# Patient Record
Sex: MALE | Race: WHITE | Employment: UNEMPLOYED | URBAN - METROPOLITAN AREA
[De-identification: names, ages, dates, MRNs, and addresses within clinical notes are randomized per-mention and may not be internally consistent; named-entity substitution may affect disease eponyms.]

---

## 2017-01-02 PROBLEM — Z48.298 AFTERCARE FOLLOWING ORGAN TRANSPLANT: Status: ACTIVE | Noted: 2017-01-02

## 2017-01-02 PROBLEM — E11.43 DIABETIC GASTROPARESIS (H): Status: ACTIVE | Noted: 2017-01-02

## 2017-01-02 PROBLEM — E87.20 METABOLIC ACIDOSIS: Status: ACTIVE | Noted: 2017-01-02

## 2017-01-02 PROBLEM — N25.81 SECONDARY RENAL HYPERPARATHYROIDISM (H): Status: ACTIVE | Noted: 2017-01-02

## 2017-01-02 PROBLEM — K31.84 DIABETIC GASTROPARESIS (H): Status: ACTIVE | Noted: 2017-01-02

## 2017-01-04 ENCOUNTER — TELEPHONE (OUTPATIENT)
Dept: NEPHROLOGY | Facility: CLINIC | Age: 57
End: 2017-01-04

## 2017-01-04 DIAGNOSIS — E87.20 ACIDOSIS: ICD-10-CM

## 2017-01-04 DIAGNOSIS — E55.9 VITAMIN D DEFICIENCY: Primary | ICD-10-CM

## 2017-01-04 DIAGNOSIS — E87.20 METABOLIC ACIDOSIS: Primary | ICD-10-CM

## 2017-01-04 RX ORDER — SODIUM BICARBONATE 650 MG/1
1300 TABLET ORAL 2 TIMES DAILY
Qty: 60 TABLET | Refills: 11 | Status: SHIPPED | OUTPATIENT
Start: 2017-01-04

## 2017-01-04 RX ORDER — ERGOCALCIFEROL 1.25 MG/1
50000 CAPSULE, LIQUID FILLED ORAL WEEKLY
Qty: 4 CAPSULE | Refills: 6 | Status: SHIPPED | OUTPATIENT
Start: 2017-01-04 | End: 2017-06-29

## 2017-01-04 NOTE — TELEPHONE ENCOUNTER
Message left for patient to return call.     If patient returns call, message is recommend starting oral sodium bicarbonate 1300 mg bid. Patient is currently taking sodium bicarbonate 650mg by mouth twice daily. The medication is increased to the 1300 mg bid and was sent out to the pharmacy.       Shellie Vega

## 2017-01-04 NOTE — TELEPHONE ENCOUNTER
oral ergocalciferol 09089 units once weekly x 26 weeks ordered by provider and was sent to pharmacy requested by patient. Spoke with patient on new medication ordered verbalized understand and BMP recheck in one week.     Shellie Vega

## 2017-01-04 NOTE — TELEPHONE ENCOUNTER
----- Message from Stormy James LPN sent at 1/4/2017  8:37 AM CST -----      ----- Message -----     From: Stormy James LPN     Sent: 1/3/2017   8:21 AM       To: Stormy James LPN    Vitamin D deficient and recommend starting oral ergocalciferol 26113 units once weekly x 26 weeks.  Also very low bicarbonate level, likely from diarrhea.  Recommend starting oral sodium bicarbonate 1300 mg bid.  Would recheck BMP in a week.

## 2017-01-18 ENCOUNTER — TELEPHONE (OUTPATIENT)
Dept: TRANSPLANT | Facility: CLINIC | Age: 57
End: 2017-01-18

## 2017-01-18 NOTE — TELEPHONE ENCOUNTER
Tacrolimus 2.9, goal 3-5  Please call pt to verify this was a good trough level. Previous level have been at goal. Repeat in 1-2 weeks. Will make dose changes then if necessary.

## 2017-01-18 NOTE — Clinical Note
PHYSICIAN ORDERS      DATE & TIME ISSUED: 2017 11:43 AM  PATIENT NAME: Lorne Juan   : 1960     [facility] MR# [if applicable]: [id number]     DIAGNOSIS:  Kidney Transplant  ICD-10 CODE: Z94.0     Patient will be into lab to repeat Tacrolimus level.    Any questions please call: [phone]    Please fax these results to [phone].    .

## 2017-01-19 DIAGNOSIS — Z94.0 KIDNEY TRANSPLANTED: Primary | ICD-10-CM

## 2017-01-19 NOTE — TELEPHONE ENCOUNTER
Patient states accurate level and agreed to repeat prograf level next week. Order faxed to Michelle loren.

## 2017-01-24 RX ORDER — TACROLIMUS 0.5 MG/1
0.5 CAPSULE ORAL 2 TIMES DAILY
Qty: 60 CAPSULE | Refills: 1 | Status: SHIPPED | OUTPATIENT
Start: 2017-01-24 | End: 2017-03-23

## 2017-02-03 ENCOUNTER — CARE COORDINATION (OUTPATIENT)
Dept: NEPHROLOGY | Facility: CLINIC | Age: 57
End: 2017-02-03

## 2017-02-03 NOTE — PROGRESS NOTES
Checked in with patient while waiting for MD to respond. Patient feels swelling has improved slightly. He went for a 2 mile walk today with no shortness of breath. He will continue to monitor BP over the weekend. Updated message sent to provider.    Wendy Padilla RN

## 2017-02-03 NOTE — PROGRESS NOTES
Reason for Call    Received message from transplant coordinator that BP is elevated.    Spoke with patient. Said most recent BP was 180's/90's 3 days ago. Heart rate is in the 70's (usually runs in the low 60's). He is back up to 149lbs. Started experiencing shortness of breath and coughing at night. Notes bilateral lower extremity edema up to knees. Denied fever, chest pain, redness, or unusual warmth.    Currently taking lasix 20mg daily (prescribed 20mg BID), losartan 50mg daily, and coreg 6.25mg BID.    Collaboration    Dr. Zaman updated    Patient currently hospitalized.    Patient was given an opportunity to ask questions and have those questions answered to his satisfaction.  Patient verbalized understanding of instructions provided and agreed to plan of care.

## 2017-02-05 ENCOUNTER — TRANSFERRED RECORDS (OUTPATIENT)
Dept: HEALTH INFORMATION MANAGEMENT | Facility: CLINIC | Age: 57
End: 2017-02-05

## 2017-02-06 ENCOUNTER — TELEPHONE (OUTPATIENT)
Dept: TRANSPLANT | Facility: CLINIC | Age: 57
End: 2017-02-06

## 2017-02-06 NOTE — TELEPHONE ENCOUNTER
Maxim Treadwell from [facility] called [facility]OT to give update on patient care. Please call him back at [phone]. Thanks

## 2017-02-07 ENCOUNTER — TELEPHONE (OUTPATIENT)
Dept: TRANSPLANT | Facility: CLINIC | Age: 57
End: 2017-02-07

## 2017-02-07 NOTE — TELEPHONE ENCOUNTER
Returning call from Dr. Sallie Yanez. She is uncomfortable restarting pt's IS meds before talking to Dr. Zaman. Page sent to Dr. Zaman with Dr. Yanez's contact info  Phone # [phone]

## 2017-02-07 NOTE — TELEPHONE ENCOUNTER
Pt with pneumonia, PRESTON. They stopped pt's IS and put him on solucortef 50 mg q8. Asked that they please restart pt's IS meds, voiced understanding. Will fax over labs.

## 2017-02-08 NOTE — PROGRESS NOTES
Patient called back. States he is currently inpatient at a hospital in [place], [place] for pneumonia. Overall he is feeling better. No concerns for nephrology at this time.    Wendy Padilla RN

## 2017-02-09 ENCOUNTER — TELEPHONE (OUTPATIENT)
Dept: TRANSPLANT | Facility: CLINIC | Age: 57
End: 2017-02-09

## 2017-02-09 NOTE — Clinical Note
PHYSICIAN ORDERS      DATE & TIME ISSUED: 2017 11:59 AM  PATIENT NAME: Lorne Juan   : 1960     [facility] MR# [if applicable]: [id number]     DIAGNOSIS:  Kidney Transplant  ICD-10 CODE: Z94.0     Patient will be in to complete the following:   CBC   BMP   Tacrolimus drug level    Any questions please call: Transplant Center [phone]    Please fax these results to [phone].    .

## 2017-02-09 NOTE — TELEPHONE ENCOUNTER
Call from Daniel Lang NP at [facility]. They are going to most likely discharge pt today. Restarting home IS meds. Pt's Cr 2.74 today. Based on Dr. Zaman's recommendation, will get pt scheduled for follow up appointment next week. Possible renal biopsy. Message sent to scheduling to get pt nephrology appointment.     Message  Received: 2 days ago       Arnoldo Zaman MD Krull, Leisa K, RN                   Talked to outside nephrologist.  Patient is doing better pneumonia wise and off oxygen.  Creatinine is trending up, now 2.4 and he has 4 grams proteinuria.  Serum potassium was initially in high 6s, now in mid 5s.  Tacrolimus and azathioprine was on hold.  Getting hydrocortisone.  Also losartan on hold.  Fluid up.     Plan to restart azathioprine today.  Probably restart tacrolimus tomorrow.  Continue on steroids.  Will need follow up labs and possibly clinic visit end of next week.  If creatinine remains elevated, may need a biopsy.     Alejandro

## 2017-02-09 NOTE — TELEPHONE ENCOUNTER
Spoke with Lorne. Let him know the plan is to repeat labs on Monday and have a nephrology appointment next week. Possibly will need biopsy. Pt voiced understanding.

## 2017-02-10 ENCOUNTER — CARE COORDINATION (OUTPATIENT)
Dept: NEPHROLOGY | Facility: CLINIC | Age: 57
End: 2017-02-10

## 2017-02-10 NOTE — PROGRESS NOTES
Reason for Call    Patient called, said he was discharged from the hospital yesterday. He's currently scheduled for labs and a follow up in clinic next week. Multiple medications were changed. Lorne is concerned as his swelling has worsened since leaving the hospital. It is now up to his thighs. Denied chest pain or shortness of breath. He was taken off of lasix while hospitalized, wants to know if he should be taking this at home. Will update transplant coordinator and Dr. Zaman.    Collaboration    Above discussed with Dr. Zaman.  Orders received.    That is hard for me to say since he was being taken care of in an outside hospital.  It would seem reasonable for him to restart the furosemide at 20 mg bid.     Plan    1. Restart lasix 20mg BID  2. Follow up as scheduled    Patient was given an opportunity to ask questions and have those questions answered to his satisfaction.  Patient verbalized understanding of instructions provided and agreed to plan of care.    Wendy Padilla RN

## 2017-02-13 ENCOUNTER — CARE COORDINATION (OUTPATIENT)
Dept: NEPHROLOGY | Facility: CLINIC | Age: 57
End: 2017-02-13

## 2017-02-13 DIAGNOSIS — I10 HTN (HYPERTENSION): ICD-10-CM

## 2017-02-13 DIAGNOSIS — Z94.0 KIDNEY REPLACED BY TRANSPLANT: ICD-10-CM

## 2017-02-13 RX ORDER — FUROSEMIDE 20 MG
40 TABLET ORAL 2 TIMES DAILY
Qty: 60 TABLET | Refills: 1
Start: 2017-02-13

## 2017-02-13 NOTE — PROGRESS NOTES
Reason for Call    Patient called to discuss weight gain and edema. He has gained 6 pounds since Friday. Currently at 165lbs. Swelling has spread, now affects abdomen and arms. Denied chest pain or shortness of breath.    Currently taking 20mg BID lasix, wants to know if he should increase dose prior to 2/16 appointment.    Collaboration    Above discussed with Dr. Zaman.  Orders received.    Yes, he can increase to 40 mg bid.       Plan    1. Increase lasix to 40mg BID  2. Follow up at 2/16 appt.    Patient was given an opportunity to ask questions and have those questions answered to his satisfaction.  Patient verbalized understanding of instructions provided and agreed to plan of care.    Wendy Padilla RN

## 2017-02-15 ENCOUNTER — TELEPHONE (OUTPATIENT)
Dept: TRANSPLANT | Facility: CLINIC | Age: 57
End: 2017-02-15

## 2017-02-16 ENCOUNTER — OFFICE VISIT (OUTPATIENT)
Dept: NEPHROLOGY | Facility: CLINIC | Age: 57
End: 2017-02-16
Attending: INTERNAL MEDICINE
Payer: MEDICARE

## 2017-02-16 VITALS
BODY MASS INDEX: 24.89 KG/M2 | TEMPERATURE: 97.7 F | SYSTOLIC BLOOD PRESSURE: 157 MMHG | WEIGHT: 158.6 LBS | HEART RATE: 77 BPM | DIASTOLIC BLOOD PRESSURE: 83 MMHG | OXYGEN SATURATION: 95 % | HEIGHT: 67 IN

## 2017-02-16 DIAGNOSIS — I12.9 RENAL HYPERTENSION, STAGE 1-4 OR UNSPECIFIED CHRONIC KIDNEY DISEASE: Primary | ICD-10-CM

## 2017-02-16 PROCEDURE — 99213 OFFICE O/P EST LOW 20 MIN: CPT | Mod: ZF

## 2017-02-16 RX ORDER — HYDRALAZINE HYDROCHLORIDE 50 MG/1
50 TABLET, FILM COATED ORAL 3 TIMES DAILY
Qty: 120 TABLET | Refills: 1 | Status: SHIPPED | OUTPATIENT
Start: 2017-02-16 | End: 2017-02-23 | Stop reason: ALTCHOICE

## 2017-02-16 ASSESSMENT — PAIN SCALES - GENERAL: PAINLEVEL: NO PAIN (0)

## 2017-02-16 NOTE — NURSING NOTE
"Chief Complaint   Patient presents with     RECHECK     Follow up kidney transplant       Initial /83  Pulse 77  Temp 97.7  F (36.5  C) (Oral)  Ht 1.702 m (5' 7\")  Wt 71.9 kg (158 lb 9.6 oz)  SpO2 95%  BMI 24.84 kg/m2 Estimated body mass index is 24.84 kg/(m^2) as calculated from the following:    Height as of this encounter: 1.702 m (5' 7\").    Weight as of this encounter: 71.9 kg (158 lb 9.6 oz).  Medication Reconciliation: jennifer LOTT CMA    "

## 2017-02-16 NOTE — LETTER
2/16/2017      RE: Lorne Juan  [address]  [facility] [address]       Assessment and Plan:  1. LDKT - baseline Cr ~ 1.9-2.2, Kidney transplant biopsy 9/2015 showed moderate to severe chronic changes with CAN and diabetic nephropathy.  Mild proteinuria.  Positive DSA to DR6 at ~ 3500 mfi.   -Cr slightly up,cardio-renal, still with edema, , will give Laisix 40 mg BID.   -INS: no changes at this time.     2. HTN - slightly elevated, still volume up. Will incresed his diuretic .    3. DM, s/p failed pancreas transplant - following with his PCP. Glucose WNL on BMP     4. Anemia in chronic renal disease - stable Hgb on MANDY. Will recheck iron studies        5. Secondary renal hyperparathyroidism - will check PTH and vitamin D level.    6. Vitamin D deficiency - will check vitamin D level and continue cholecalciferol.    7. Metabolic acidosis - likely secondary to decreased kidney function and diarrhea.  On Na bicarbonate     8. Diabetic gastroparesis - stable symptoms and patient is following with GI.    9. Skin cancer risk - no new skin lesions.  He will see the dermatologist on 2/21/17     Assessment and plan was discussed with patient and he voiced his understanding and agreement.    Reason for Visit:  Mr. Juan is here for routine follow up.    HPI:   Lorne Juan is a 56 year old male with ESKD from DM and is status post LDKT on 2/15/91.         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    Mr. Juan was recent in the hospital with CAP and ARF. His lasix and Losartan were hold at D/C. He noticed increased in  edema and weight. Was restarted on Lasix 40 mg Qd since Mo, and he noticed an improvement, he lost 9 pounds, but he still have edema.His Cr today is slightly up -trending , possible ove-diurasing     Home BP: 130-150/70-80s.      ROS:   A comprehensive review of systems was obtained and negative, except as noted in the HPI or PMH.    Active Medical Problems:  Patient Active Problem List   Diagnosis     Status post pancreas transplantation (H)     Thrombocytopenia (H)     Vitamin D deficiency     Osteoporosis     Disorder of bone and cartilage     Diabetes mellitus type 1 (H)     Kidney replaced by transplant     Immunosuppressed status (H)     Anemia in chronic renal disease     Metabolic acidosis     Aftercare following organ transplant     Secondary renal hyperparathyroidism (H)     Diabetic gastroparesis (H)       Personal Hx:  Social History     Social History     Marital status: Single     Spouse name: N/A     Number of children: N/A     Years of education: N/A     Occupational History     Not on file.     Social History Main Topics     Smoking status: Never Smoker     Smokeless tobacco: Never Used     Alcohol use Yes      Comment: Rare     Drug use: No     Sexual activity: Not on file     Other Topics Concern     Not on file     Social History Narrative       Allergies:  Allergies   Allergen Reactions     Bactrim Diarrhea     Benadryl [Altaryl]      hyperactivity     Diamox [Acetazolamide Sodium] Other (See Comments)     Light headedness     Morphine Sulfate Itching       Medications:  Prior to Admission medications    Medication Sig Start Date End Date Taking? Authorizing Provider   azaTHIOprine 75 MG TABS Take 75 mg by mouth daily 12/20/16  Yes Arnoldo Zaman MD   sodium bicarbonate 650 MG tablet Take 1 tablet (650 mg) by mouth 2 times daily 12/20/16  Yes Arnoldo Zaman MD   LANTUS SOLOSTAR 100 UNIT/ML soln Inject 4 units in the AM; inject 6 units in the PM 11/22/16  Yes Taylor Bello PA-C    losartan (COZAAR) 25 MG tablet Take 2 tablets (50 mg) by mouth daily 11/21/16  Yes Arnoldo Zaman MD   blood glucose monitoring (NO BRAND SPECIFIED) test strip Test 6 times per day with  Prodigy no  Coding  Glucose strips . 11/11/16  Yes Taylor Bello PA-C   insulin pen needle (BD MARQUISE U/F) 32G X 4 MM Use 4-6 daily or as directed. 11/11/16  Yes Taylor Bello PA-C   furosemide (LASIX) 20 MG tablet Take 1 tablet (20 mg) by mouth 2 times daily 8/16/16  Yes Arnoldo Zaman MD   carvedilol (COREG) 6.25 MG tablet Take 1 tablet (6.25 mg) by mouth 2 times daily 8/15/16  Yes Arnoldo Zaman MD   tacrolimus (PROGRAF - GENERIC EQUIVALENT) 0.5 MG capsule Take 1 capsule (0.5 mg) by mouth 2 times daily 6/22/16  Yes Daija Mccann MD   insulin lispro (HUMALOG) 100 UNIT/ML Cartridge Carb counting with meals approx 20 units daily 4/19/16  Yes Taylor Bello PA-C   glucagon (GLUCAGON EMERGENCY) 1 MG injection Use in event of hypoglycemia 3/21/16  Yes Alis Slaughter MD   predniSONE (DELTASONE) 2.5 MG tablet Take 1 tablet (2.5 mg) by mouth daily 3/14/16  Yes Daija Mccann MD   alendronate (FOSAMAX) 70 MG tablet Take 1 tablet (70 mg) by mouth every 7 days 11/3/15  Yes Alis Slaughter MD   insulin glargine (LANTUS) 100 UNIT/ML vial Inject 4 units in the AM; inject 6 units in the PM. 8/25/15  Yes Precious Bahena PA-C   Injection Device for insulin (HUMAPEN LUXURA HD) ROBINSON 1 each 5 times daily Use as directed with Humalog 5/22/15  Yes Taylor Bello PA-C   aspirin  MG tablet Take 1 tablet (325 mg) by mouth daily 10/23/13  Yes Isauro Mehta MD   calcium 500-125 MG-UNIT TABS Take  by mouth. Plus D take one daily   Yes Reported, Patient   brimonidine-timolol (COMBIGAN) 0.2-0.5 % ophthalmic solution Place 1 drop into the right eye 2 times daily NOT ON HOLD   Yes Unknown, Entered By History   Multiple Vitamin (MULTIVITAMIN OR) Take 1 tablet by mouth daily. 4/25/11   "Yes Reported, Patient   brinzolamide (AZOPT) 1 % ophthalmic suspension Place 1 drop into the right eye 2 times daily. 4/25/11  Yes Reported, Patient       Vitals:  /83  Pulse 77  Temp 97.7  F (36.5  C) (Oral)  Ht 1.702 m (5' 7\")  Wt 71.9 kg (158 lb 9.6 oz)  SpO2 95%  BMI 24.84 kg/m2    Exam:   GENERAL APPEARANCE: alert and no distress  HENT: mouth without ulcers or lesions  LYMPHATICS: no cervical or supraclavicular nodes  RESP: lungs clear to auscultation - no rales, rhonchi or wheezes  CV: regular rhythm, normal rate, no rub, no murmur  EDEMA: no LE edema bilaterally  ABDOMEN: soft, nondistended, nontender, bowel sounds normal  MS: extremities normal - no gross deformities noted, no evidence of inflammation in joints, no muscle tenderness  SKIN: no rash  TX KIDNEY: normal    Results: reviewed, last labs on 2/13/17   Cr: 2.4 slightly up   BUN: 62     Patient was seen and evaluated by me, John Dupont MD. I have reviewed the note and agree with the the plan of care as documented by the fellow.      Eben Oliveira MD      "

## 2017-02-16 NOTE — MR AVS SNAPSHOT
"              After Visit Summary   2/16/2017    Lorne Juan    MRN: [id number]           Patient Information     Date Of Birth          1960        Visit Information        Provider Department      2/16/2017 3:30 PM Eben Oliveira MD [facility]        Today's Diagnoses     Renal hypertension, stage 1-4 or unspecified chronic kidney disease    -  1       Follow-ups after your visit        Your next 10 appointments already scheduled     Jun 06, 2017 10:15 AM CDT   Lab with  LAB   [facility] ([facility])    [address] [place] [address]  1st Floor  [facility] [address] [phone]            Jun 06, 2017 11:00 AM CDT   (Arrive by 10:30 AM)   Return Kidney Transplant with Arnoldo Zaman MD   [facility] ([facility])    [address] [place] [address]  3rd [facility] [address] [phone]              Who to contact     If you have questions or need follow up information about today's clinic visit or your schedule please contact [facility] NEPHROLOG[facility] directly at [phone].  Normal or non-critical lab and imaging results will be communicated to you by Automsofthart, letter or phone within 4 business days after the clinic has received the results. If you do not hear from us within 7 days, please contact the clinic through Actacellt or phone. If you have a critical or abnormal lab result, we will notify you by phone as soon as possible.  Submit refill requests through Matterport or call your pharmacy and they will forward the refill request to us. Please allow 3 business days for your refill to be completed.          Additional Information About Your Visit        Automsofthart Information     Matterport lets you send messages to your doctor, view your test results, renew your prescriptions, schedule appointments and more. To sign up, go to [email or web address] . Click on \"Log in\" on the left side of the screen, which will take you to the " "Welcome page. Then click on \"Sign up Now\" on the right side of the page.     You will be asked to enter the access code listed below, as well as some personal information. Please follow the directions to create your username and password.     Your access code is: [email or web address]  Expires: 2017  1:44 PM     Your access code will  in 90 days. If you need help or a new code, please call your [facility] or [phone].        Care EveryWhere ID     This is your Care EveryWhere ID. This could be used by other organizations to access your [facility] medical records  [id number]        Your Vitals Were     Pulse Temperature Height Pulse Oximetry BMI (Body Mass Index)       77 97.7  F (36.5  C) (Oral) 1.702 m (5' 7\") 95% 24.84 kg/m2        Blood Pressure from Last 3 Encounters:   17 157/83   16 134/79   16 142/81    Weight from Last 3 Encounters:   17 71.9 kg (158 lb 9.6 oz)   16 54.4 kg (120 lb)   16 71.2 kg (157 lb)              Today, you had the following     No orders found for display         Today's Medication Changes          These changes are accurate as of: 17 11:59 PM.  If you have any questions, ask your nurse or doctor.               Start taking these medicines.        Dose/Directions    hydrALAZINE 50 MG tablet   Commonly known as:  APRESOLINE   Used for:  Renal hypertension, stage 1-4 or unspecified chronic kidney disease        Dose:  50 mg   Take 1 tablet (50 mg) by mouth 3 times daily   Quantity:  120 tablet   Refills:  1            Where to get your medicines      These medications were sent to [place], [place] - [address] [place] [address] [facility] [address]    Hours:  TRANSPLANT PHONE NUMBER [phone] Phone:  [phone]     hydrALAZINE 50 MG tablet                Primary Care Provider    None Specified       No primary provider on file.        Thank you!     Thank you for " choosing [facility] NEPHROLOGY  for your care. Our goal is always to provide you with excellent care. Hearing back from our patients is one way we can continue to improve our services. Please take a few minutes to complete the written survey that you may receive in the mail after your visit with us. Thank you!             Your Updated Medication List - Protect others around you: Learn how to safely use, store and throw away your medicines at www.disposemymeds.org.          This list is accurate as of: 2/16/17 11:59 PM.  Always use your most recent med list.                   Brand Name Dispense Instructions for use    alendronate 70 MG tablet    FOSAMAX    12 tablet    Take 1 tablet (70 mg) by mouth every 7 days       aspirin  MG EC tablet     90 tablet    Take 1 tablet (325 mg) by mouth daily       azaTHIOprine 75 MG Tabs     90 tablet    Take 75 mg by mouth daily       blood glucose monitoring test strip    no brand specified    550 each    Test 6 times per day with  Prodigy no  Coding  Glucose strips .       brimonidine-timolol 0.2-0.5 % ophthalmic solution    COMBIGAN     Place 1 drop into the right eye 2 times daily NOT ON HOLD       brinzolamide 1 % ophthalmic susp    AZOPT     Place 1 drop into the right eye 2 times daily.       calcium 500-125 MG-UNIT Tabs      Take  by mouth. Plus D take one daily       furosemide 20 MG tablet    LASIX    60 tablet    Take 2 tablets (40 mg) by mouth 2 times daily       glucagon 1 MG kit    GLUCAGON EMERGENCY    2 each    Use in event of hypoglycemia       hydrALAZINE 50 MG tablet    APRESOLINE    120 tablet    Take 1 tablet (50 mg) by mouth 3 times daily       Injection Device for insulin Helen SANTOROURA HD    1 each    1 each 5 times daily Use as directed with Humalog       * insulin glargine 100 UNIT/ML injection    LANTUS    15 mL    Inject 4 units in the AM; inject 6 units in the PM.       * LANTUS SOLOSTAR 100 UNIT/ML injection   Generic drug:  insulin  glargine     15 mL    Inject 4 units in the AM; inject 6 units in the PM       insulin lispro 100 UNIT/ML Cartridge    HUMALOG    15 mL    Carb counting with meals approx 20 units daily       insulin pen needle 32G X 4 MM    BD MARQUISE U/F    400 each    Use 4-6 daily or as directed.       losartan 25 MG tablet    COZAAR    180 tablet    Take 2 tablets (50 mg) by mouth daily       MULTIVITAMIN PO      Take 1 tablet by mouth daily.       sodium bicarbonate 650 MG tablet     60 tablet    Take 2 tablets (1,300 mg) by mouth 2 times daily       vitamin D 36792 UNIT capsule    ERGOCALCIFEROL    4 capsule    Take 1 capsule (50,000 Units) by mouth once a week for 26 doses       * Notice:  This list has 2 medication(s) that are the same as other medications prescribed for you. Read the directions carefully, and ask your doctor or other care provider to review them with you.

## 2017-02-16 NOTE — TELEPHONE ENCOUNTER
Weight this am 156. BP improved.  Does complain his R arm is aching after IV with last hospital admission. He will discuss with nephrology this afternoon at his appointment.

## 2017-02-23 ENCOUNTER — CARE COORDINATION (OUTPATIENT)
Dept: NEPHROLOGY | Facility: CLINIC | Age: 57
End: 2017-02-23

## 2017-02-23 DIAGNOSIS — I10 HTN (HYPERTENSION): ICD-10-CM

## 2017-02-23 RX ORDER — CARVEDILOL 6.25 MG/1
37.5 TABLET ORAL 2 TIMES DAILY
Qty: 60 TABLET | Refills: 11 | COMMUNITY
Start: 2017-02-23

## 2017-02-23 RX ORDER — AMLODIPINE BESYLATE 5 MG/1
5 TABLET ORAL DAILY
Qty: 90 TABLET | Refills: 3 | COMMUNITY
Start: 2017-02-23 | End: 2017-06-22

## 2017-02-23 NOTE — PROGRESS NOTES
Reason for Call    Received message from transplant coordinator regarding BP concerns and med changes.    Patient met with a local nephrologist who switched around his medications. Furosemide dose stayed the same. She discontinued hydralazine and losartan, increased carvedilol to 37.5mg BID (from 25mg), and added amlodipine 5mg.    Weight is stable around 160lbs. Notes ongoing edema. Shortness of breath has improved, he thinks it was due to hydralazine. Hasn't checked BP yet today. He's scheduled to meet with her again next week.    Collaboration    Updated Dr. Zaman.     He needs to just follow up with his primary nephrologist at this point.     Plan    1. Follow up with primary nephrologist    Patient was given an opportunity to ask questions and have those questions answered to his satisfaction.  Patient verbalized understanding of instructions provided and agreed to plan of care.      Wendy Padilla RN

## 2017-02-27 ENCOUNTER — TELEPHONE (OUTPATIENT)
Dept: TRANSPLANT | Facility: CLINIC | Age: 57
End: 2017-02-27

## 2017-02-27 NOTE — TELEPHONE ENCOUNTER
Staff message from Wendy in nephrology that pt had questions about transplant.   I called Lorne. He was curious how the different medications worked to manage his BP, fluid status, and impacted his kidney. We discussed this.   He is almost down to his dry weight. Is wondering if lasix dose can be decreased. Will have nephrology follow up with him.

## 2017-03-23 DIAGNOSIS — Z94.0 KIDNEY TRANSPLANTED: ICD-10-CM

## 2017-03-23 DIAGNOSIS — Z94.0 KIDNEY REPLACED BY TRANSPLANT: Primary | ICD-10-CM

## 2017-03-24 RX ORDER — TACROLIMUS 0.5 MG/1
0.5 CAPSULE ORAL 2 TIMES DAILY
Qty: 60 CAPSULE | Refills: 11 | Status: SHIPPED | OUTPATIENT
Start: 2017-03-24 | End: 2018-04-02

## 2017-03-24 RX ORDER — PREDNISONE 2.5 MG/1
2.5 TABLET ORAL DAILY
Qty: 30 TABLET | Refills: 11 | Status: SHIPPED | OUTPATIENT
Start: 2017-03-24 | End: 2018-05-03

## 2017-04-23 NOTE — PROGRESS NOTES
Assessment and Plan:  1. LDKT - baseline Cr ~ 1.9-2.2, Kidney transplant biopsy 9/2015 showed moderate to severe chronic changes with CAN and diabetic nephropathy.  Mild proteinuria.  Positive DSA to DR6 at ~ 3500 mfi.   -Cr slightly up,cardio-renal, still with edema, , will give Laisix 40 mg BID.   -INS: no changes at this time.     2. HTN - slightly elevated, still volume up. Will incresed his diuretic .    3. DM, s/p failed pancreas transplant - following with his PCP. Glucose WNL on BMP     4. Anemia in chronic renal disease - stable Hgb on MANDY. Will recheck iron studies        5. Secondary renal hyperparathyroidism - will check PTH and vitamin D level.    6. Vitamin D deficiency - will check vitamin D level and continue cholecalciferol.    7. Metabolic acidosis - likely secondary to decreased kidney function and diarrhea.  On Na bicarbonate     8. Diabetic gastroparesis - stable symptoms and patient is following with GI.    9. Skin cancer risk - no new skin lesions.  He will see the dermatologist on 2/21/17     Assessment and plan was discussed with patient and he voiced his understanding and agreement.    Reason for Visit:  Mr. Juan is here for routine follow up.    HPI:   Lorne Juan is a 56 year old male with ESKD from DM and is status post LDKT on 2/15/91.         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    Mr. Juan was recent in the hospital with CAP and ARF. His lasix and Losartan were hold at D/C. He noticed increased in edema and weight. Was restarted on Lasix 40 mg Qd since Mo, and he noticed an  improvement, he lost 9 pounds, but he still have edema.His Cr today is slightly up -trending , possible ove-diurasing     Home BP: 130-150/70-80s.      ROS:   A comprehensive review of systems was obtained and negative, except as noted in the HPI or PMH.    Active Medical Problems:  Patient Active Problem List   Diagnosis     Status post pancreas transplantation (H)     Thrombocytopenia (H)     Vitamin D deficiency     Osteoporosis     Disorder of bone and cartilage     Diabetes mellitus type 1 (H)     Kidney replaced by transplant     Immunosuppressed status (H)     Anemia in chronic renal disease     Metabolic acidosis     Aftercare following organ transplant     Secondary renal hyperparathyroidism (H)     Diabetic gastroparesis (H)       Personal Hx:  Social History     Social History     Marital status: Single     Spouse name: N/A     Number of children: N/A     Years of education: N/A     Occupational History     Not on file.     Social History Main Topics     Smoking status: Never Smoker     Smokeless tobacco: Never Used     Alcohol use Yes      Comment: Rare     Drug use: No     Sexual activity: Not on file     Other Topics Concern     Not on file     Social History Narrative       Allergies:  Allergies   Allergen Reactions     Bactrim Diarrhea     Benadryl [Altaryl]      hyperactivity     Diamox [Acetazolamide Sodium] Other (See Comments)     Light headedness     Morphine Sulfate Itching       Medications:  Prior to Admission medications    Medication Sig Start Date End Date Taking? Authorizing Provider   azaTHIOprine 75 MG TABS Take 75 mg by mouth daily 12/20/16  Yes Arnoldo Zaman MD   sodium bicarbonate 650 MG tablet Take 1 tablet (650 mg) by mouth 2 times daily 12/20/16  Yes Arnoldo Zaman MD   LANTUS SOLOSTAR 100 UNIT/ML soln Inject 4 units in the AM; inject 6 units in the PM 11/22/16  Yes Taylor Bello PA-C   losartan (COZAAR) 25 MG tablet Take 2 tablets (50 mg) by mouth daily  11/21/16  Yes Arnoldo Zaman MD   blood glucose monitoring (NO BRAND SPECIFIED) test strip Test 6 times per day with  Prodigy no  Coding  Glucose strips . 11/11/16  Yes Taylor Bello PA-C   insulin pen needle (BD MARQUISE U/F) 32G X 4 MM Use 4-6 daily or as directed. 11/11/16  Yes Taylor Bello PA-C   furosemide (LASIX) 20 MG tablet Take 1 tablet (20 mg) by mouth 2 times daily 8/16/16  Yes Arnoldo Zaman MD   carvedilol (COREG) 6.25 MG tablet Take 1 tablet (6.25 mg) by mouth 2 times daily 8/15/16  Yes Arnoldo Zaman MD   tacrolimus (PROGRAF - GENERIC EQUIVALENT) 0.5 MG capsule Take 1 capsule (0.5 mg) by mouth 2 times daily 6/22/16  Yes Daija Mccann MD   insulin lispro (HUMALOG) 100 UNIT/ML Cartridge Carb counting with meals approx 20 units daily 4/19/16  Yes Taylor Bello PA-C   glucagon (GLUCAGON EMERGENCY) 1 MG injection Use in event of hypoglycemia 3/21/16  Yes Alis Slaughter MD   predniSONE (DELTASONE) 2.5 MG tablet Take 1 tablet (2.5 mg) by mouth daily 3/14/16  Yes Daija Mccann MD   alendronate (FOSAMAX) 70 MG tablet Take 1 tablet (70 mg) by mouth every 7 days 11/3/15  Yes Alis Slaughter MD   insulin glargine (LANTUS) 100 UNIT/ML vial Inject 4 units in the AM; inject 6 units in the PM. 8/25/15  Yes Precious Bahena PA-C   Injection Device for insulin (HUMAPEN LUXURA HD) ROBINSON 1 each 5 times daily Use as directed with Humalog 5/22/15  Yes Taylor Bello PA-C   aspirin  MG tablet Take 1 tablet (325 mg) by mouth daily 10/23/13  Yes Isauro Mehta MD   calcium 500-125 MG-UNIT TABS Take  by mouth. Plus D take one daily   Yes Reported, Patient   brimonidine-timolol (COMBIGAN) 0.2-0.5 % ophthalmic solution Place 1 drop into the right eye 2 times daily NOT ON HOLD   Yes Unknown, Entered By History   Multiple Vitamin (MULTIVITAMIN OR) Take 1 tablet by mouth daily. 4/25/11  Yes Reported, Patient   brinzolamide (AZOPT) 1 % ophthalmic suspension  "Place 1 drop into the right eye 2 times daily. 4/25/11  Yes Reported, Patient       Vitals:  /83  Pulse 77  Temp 97.7  F (36.5  C) (Oral)  Ht 1.702 m (5' 7\")  Wt 71.9 kg (158 lb 9.6 oz)  SpO2 95%  BMI 24.84 kg/m2    Exam:   GENERAL APPEARANCE: alert and no distress  HENT: mouth without ulcers or lesions  LYMPHATICS: no cervical or supraclavicular nodes  RESP: lungs clear to auscultation - no rales, rhonchi or wheezes  CV: regular rhythm, normal rate, no rub, no murmur  EDEMA: no LE edema bilaterally  ABDOMEN: soft, nondistended, nontender, bowel sounds normal  MS: extremities normal - no gross deformities noted, no evidence of inflammation in joints, no muscle tenderness  SKIN: no rash  TX KIDNEY: normal    Results: reviewed, last labs on 2/13/17   Cr: 2.4 slightly up   BUN: 62     Patient was seen and evaluated by me, John Dupont MD. I have reviewed the note and agree with the the plan of care as documented by the fellow.    "

## 2017-05-25 ENCOUNTER — TELEPHONE (OUTPATIENT)
Dept: NEPHROLOGY | Facility: CLINIC | Age: 57
End: 2017-05-25

## 2017-05-25 NOTE — TELEPHONE ENCOUNTER
Fernando, this is Junie's office from [facility] on the 3rd floor at [facility] located at [address] [place] [address]. We are reminding you of your upcoming Nephrology appointment on 6/6/2017 at 11:00 am. Please arrive an hour prior to your appointment time for labs. Also, please bring an updated medication list including dose of prescribed and over the counter medications you are currently taking or your labeled medication bottles with you to your appointment. If you have any questions or would like to cancel or reschedule your appointment, please call us at [phone].     If you are having labs draw same day you need a lab appointment scheduled 1 hour prior to your visit.    You are welcome to have your labs done 2-7 days before your appointment at any [facility] or [facility] facility. If you have your labs completed before your appointment, please still come 30 minutes early for check-in.     Thank-You for allowing us to be a member of your Health Care Team,     Ainsley De Jesus,. CMA

## 2017-06-02 ENCOUNTER — TELEPHONE (OUTPATIENT)
Dept: TRANSPLANT | Facility: CLINIC | Age: 57
End: 2017-06-02

## 2017-06-02 NOTE — TELEPHONE ENCOUNTER
Spoke with patient about missing medications.  Plan is to get his medications today by mail.    Instructed to take a dose as soon as medications arrive.  Then repeat taking the Tacrolimus   In 6 hours.  Patient should then be caught up with transplant medication doses.

## 2017-06-06 ENCOUNTER — TELEPHONE (OUTPATIENT)
Dept: TRANSPLANT | Facility: CLINIC | Age: 57
End: 2017-06-06

## 2017-06-06 ENCOUNTER — RESULTS ONLY (OUTPATIENT)
Dept: OTHER | Facility: CLINIC | Age: 57
End: 2017-06-06

## 2017-06-06 ENCOUNTER — OFFICE VISIT (OUTPATIENT)
Dept: NEPHROLOGY | Facility: CLINIC | Age: 57
End: 2017-06-06
Attending: INTERNAL MEDICINE
Payer: MEDICARE

## 2017-06-06 VITALS
TEMPERATURE: 97.5 F | HEART RATE: 62 BPM | HEIGHT: 67 IN | SYSTOLIC BLOOD PRESSURE: 121 MMHG | RESPIRATION RATE: 18 BRPM | DIASTOLIC BLOOD PRESSURE: 71 MMHG | BODY MASS INDEX: 23.25 KG/M2 | WEIGHT: 148.1 LBS

## 2017-06-06 DIAGNOSIS — D63.1 ANEMIA IN CHRONIC RENAL DISEASE: ICD-10-CM

## 2017-06-06 DIAGNOSIS — Z94.83 STATUS POST PANCREAS TRANSPLANTATION (H): ICD-10-CM

## 2017-06-06 DIAGNOSIS — D84.9 IMMUNOSUPPRESSED STATUS (H): ICD-10-CM

## 2017-06-06 DIAGNOSIS — Z79.899 HIGH RISK MEDICATION USE: ICD-10-CM

## 2017-06-06 DIAGNOSIS — N18.9 ANEMIA IN CHRONIC RENAL DISEASE: ICD-10-CM

## 2017-06-06 DIAGNOSIS — N25.81 SECONDARY RENAL HYPERPARATHYROIDISM (H): ICD-10-CM

## 2017-06-06 DIAGNOSIS — E55.9 VITAMIN D DEFICIENCY: ICD-10-CM

## 2017-06-06 DIAGNOSIS — Z48.298 AFTERCARE FOLLOWING ORGAN TRANSPLANT: ICD-10-CM

## 2017-06-06 DIAGNOSIS — I15.1 HYPERTENSION SECONDARY TO OTHER RENAL DISORDERS: ICD-10-CM

## 2017-06-06 DIAGNOSIS — Z94.0 KIDNEY REPLACED BY TRANSPLANT: Primary | ICD-10-CM

## 2017-06-06 DIAGNOSIS — Z94.0 KIDNEY REPLACED BY TRANSPLANT: ICD-10-CM

## 2017-06-06 LAB
AMYLASE SERPL-CCNC: 40 U/L (ref 30–110)
ANION GAP SERPL CALCULATED.3IONS-SCNC: 10 MMOL/L (ref 3–14)
BUN SERPL-MCNC: 67 MG/DL (ref 7–30)
CALCIUM SERPL-MCNC: 7.6 MG/DL (ref 8.5–10.1)
CHLORIDE SERPL-SCNC: 105 MMOL/L (ref 94–109)
CHOLEST SERPL-MCNC: 100 MG/DL
CO2 SERPL-SCNC: 21 MMOL/L (ref 20–32)
CREAT SERPL-MCNC: 2.95 MG/DL (ref 0.66–1.25)
CREAT UR-MCNC: 30 MG/DL
ERYTHROCYTE [DISTWIDTH] IN BLOOD BY AUTOMATED COUNT: 17.5 % (ref 10–15)
GFR SERPL CREATININE-BSD FRML MDRD: 22 ML/MIN/1.7M2
GLUCOSE SERPL-MCNC: 181 MG/DL (ref 70–99)
HBA1C MFR BLD: 6.1 % (ref 4.3–6)
HCT VFR BLD AUTO: 33.3 % (ref 40–53)
HDLC SERPL-MCNC: 37 MG/DL
HGB BLD-MCNC: 10.6 G/DL (ref 13.3–17.7)
LDLC SERPL CALC-MCNC: 50 MG/DL
LIPASE SERPL-CCNC: 96 U/L (ref 73–393)
MCH RBC QN AUTO: 30 PG (ref 26.5–33)
MCHC RBC AUTO-ENTMCNC: 31.8 G/DL (ref 31.5–36.5)
MCV RBC AUTO: 94 FL (ref 78–100)
NONHDLC SERPL-MCNC: 64 MG/DL
PLATELET # BLD AUTO: 182 10E9/L (ref 150–450)
POTASSIUM SERPL-SCNC: 4.4 MMOL/L (ref 3.4–5.3)
PROT UR-MCNC: 0.56 G/L
PROT/CREAT 24H UR: 1.87 G/G CR (ref 0–0.2)
RBC # BLD AUTO: 3.53 10E12/L (ref 4.4–5.9)
SODIUM SERPL-SCNC: 137 MMOL/L (ref 133–144)
TRIGL SERPL-MCNC: 68 MG/DL
WBC # BLD AUTO: 4.7 10E9/L (ref 4–11)

## 2017-06-06 PROCEDURE — 83690 ASSAY OF LIPASE: CPT | Performed by: INTERNAL MEDICINE

## 2017-06-06 PROCEDURE — 99213 OFFICE O/P EST LOW 20 MIN: CPT | Mod: ZF

## 2017-06-06 PROCEDURE — 85027 COMPLETE CBC AUTOMATED: CPT | Performed by: INTERNAL MEDICINE

## 2017-06-06 PROCEDURE — 36415 COLL VENOUS BLD VENIPUNCTURE: CPT | Performed by: INTERNAL MEDICINE

## 2017-06-06 PROCEDURE — 83036 HEMOGLOBIN GLYCOSYLATED A1C: CPT | Performed by: INTERNAL MEDICINE

## 2017-06-06 PROCEDURE — 80048 BASIC METABOLIC PNL TOTAL CA: CPT | Performed by: INTERNAL MEDICINE

## 2017-06-06 PROCEDURE — 86833 HLA CLASS II HIGH DEFIN QUAL: CPT | Performed by: INTERNAL MEDICINE

## 2017-06-06 PROCEDURE — 84156 ASSAY OF PROTEIN URINE: CPT | Performed by: INTERNAL MEDICINE

## 2017-06-06 PROCEDURE — 82150 ASSAY OF AMYLASE: CPT | Performed by: INTERNAL MEDICINE

## 2017-06-06 PROCEDURE — 87799 DETECT AGENT NOS DNA QUANT: CPT | Performed by: INTERNAL MEDICINE

## 2017-06-06 PROCEDURE — 80061 LIPID PANEL: CPT | Performed by: INTERNAL MEDICINE

## 2017-06-06 PROCEDURE — 86832 HLA CLASS I HIGH DEFIN QUAL: CPT | Performed by: INTERNAL MEDICINE

## 2017-06-06 RX ORDER — LOSARTAN POTASSIUM 25 MG/1
25 TABLET ORAL
COMMUNITY
Start: 2017-03-02

## 2017-06-06 RX ORDER — CALCITRIOL 0.25 UG/1
0.25 CAPSULE, LIQUID FILLED ORAL
COMMUNITY
Start: 2017-04-26

## 2017-06-06 ASSESSMENT — PAIN SCALES - GENERAL: PAINLEVEL: NO PAIN (0)

## 2017-06-06 NOTE — LETTER
6/6/2017      RE: Lorne Juan  [address]  [facility] [address]       Assessment and Plan:  1. LDKT - baseline Cr ~ 1.9-2.2, which has increased with last couple of lab checks, now up to 2.9 today.  Kidney transplant biopsy 9/2015 showed moderate to severe chronic changes with CAN and diabetic nephropathy.  Increased proteinuria, now moderate at almost 2 grams.  Positive DSA in the past.  Most recent tacrolimus level has been running lower than goal and with change in immunosuppression, concerned that higher creatinine could be an indication for acute rejection versus progression of chronic changes.  Would recommend a kidney transplant biopsy.  Patient would prefer to get that done at home by his local Nephrologist and will see if that can happen.  Will hold off on any changes in immunosuppression at this time, except to increase tacrolimus to goal.  2. HTN - okay control at target of less than 140/90 at clinic visits, but seems to be running higher at home.  With significant edema, will increase furosemide to 60 mg bid.  Patient to follow up with PCP and/or primary Nephrologist.  Recommend patient bring in his BP cuff to his local clinic to ensure it is accurate.  3. DM, s/p failed pancreas transplant - improved control with last HbA1c 6.1%, possibly from worsening kidney function.  4. Anemia in chronic renal disease - improved Hgb on MANDY.  Iron replete.  Will defer to primary Nephrologist to manage.  5. Secondary renal hyperparathyroidism - minimal increase in PTH and will continue on calcitriol.  Will defer to primary Nephrologist to manage.  6. Vitamin D deficiency - low vitamin D level and continue ergocalciferol.  Will defer to primary Nephrologist to manage.  7. Hypocalcemia - low serum calcium level and recommend increasing oral calcium supplement to twice daily.  8. Metabolic acidosis - slightly low, stable serum bicarbonate level.  Will continue on sodium bicarbonate supplement.  Will defer to  primary Nephrologist to manage.  9. Diabetic gastroparesis - much improved symptoms off mycophenolic acid.  Patient to follow up with GI as needed.  10. Chronic diarrhea - resolved after change from mycophenolic acid to azathioprine.  11. Weight loss - stable to improved weight after resolution of diarrhea.  12. Skin cancer risk - no new skin lesions.  Recommend regular follow up with Dermatology.  13. Recommend return visit in 8 months.    Assessment and plan was discussed with patient and he voiced his understanding and agreement.    Reason for Visit:  Mr. Juan is here for routine follow up.    HPI:   Lorne Juan is a 56 year old male with ESKD from DM and is status post LDKT on 2/15/91.         Transplant Hx:       Tx: LDKT  Date: 2/15/91       Present Maintenance IS: Tacrolimus, Azathioprine and Prednisone       Baseline Creatinine: 1.9-2.2       Recent DSA: ?  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    Mr. Juan reports feeling okay overall with some medical complaints.  His energy level is lower as he has tiredness and fatigue, but no recent change.  He is active and does get a little exercise, mostly with walking his dog.  Denies any chest pain or shortness of breath with exertion.  Appetite is okay and weight has been mostly stable.  No nausea or vomiting.  No further diarrhea after being changed from mycophenolic acid to azathioprine, along with use of Imodium as needed.  No fever, sweats or chills.  Some increase in leg swelling recently.    Home BP: 140-150/70s at home, but reports much lower readings during recent clinic visits.      ROS:   A comprehensive review of systems was obtained and negative, except as noted in the HPI or PMH.    Active Medical Problems:  Patient Active Problem  List   Diagnosis     Status post pancreas transplantation (H)     Thrombocytopenia (H)     Vitamin D deficiency     Osteoporosis     Disorder of bone and cartilage     Diabetes mellitus type 1 (H)     Kidney replaced by transplant     Immunosuppressed status (H)     Anemia in chronic renal disease     Metabolic acidosis     Aftercare following organ transplant     Secondary renal hyperparathyroidism (H)     Diabetic gastroparesis (H)     Hypertension secondary to other renal disorders       Personal Hx:  Social History     Social History     Marital status: Single     Spouse name: N/A     Number of children: N/A     Years of education: N/A     Occupational History     Not on file.     Social History Main Topics     Smoking status: Never Smoker     Smokeless tobacco: Never Used     Alcohol use Yes      Comment: Rare     Drug use: No     Sexual activity: Not on file     Other Topics Concern     Not on file     Social History Narrative       Allergies:  Allergies   Allergen Reactions     Bactrim Diarrhea     Benadryl [Altaryl]      hyperactivity     Diamox [Acetazolamide Sodium] Other (See Comments)     Light headedness     Morphine Sulfate Itching       Medications:  Prior to Admission medications    Medication Sig Start Date End Date Taking? Authorizing Provider   azaTHIOprine 75 MG TABS Take 75 mg by mouth daily 12/20/16  Yes Arnoldo Zaman MD   sodium bicarbonate 650 MG tablet Take 1 tablet (650 mg) by mouth 2 times daily 12/20/16  Yes Arnoldo Zaman MD   LANTUS SOLOSTAR 100 UNIT/ML soln Inject 4 units in the AM; inject 6 units in the PM 11/22/16  Yes Taylor Bello PA-C   losartan (COZAAR) 25 MG tablet Take 2 tablets (50 mg) by mouth daily 11/21/16  Yes Arnoldo Zaman MD   blood glucose monitoring (NO BRAND SPECIFIED) test strip Test 6 times per day with  Prodigy no  Coding  Glucose strips . 11/11/16  Yes Taylor Bello PA-C   insulin pen needle (BD MARQUISE U/F) 32G X 4 MM Use 4-6 daily or  "as directed. 11/11/16  Yes Taylor Bello PA-C   furosemide (LASIX) 20 MG tablet Take 1 tablet (20 mg) by mouth 2 times daily 8/16/16  Yes Arnoldo Zaman MD   carvedilol (COREG) 6.25 MG tablet Take 1 tablet (6.25 mg) by mouth 2 times daily 8/15/16  Yes Arnoldo Zaman MD   tacrolimus (PROGRAF - GENERIC EQUIVALENT) 0.5 MG capsule Take 1 capsule (0.5 mg) by mouth 2 times daily 6/22/16  Yes Daija Mccann MD   insulin lispro (HUMALOG) 100 UNIT/ML Cartridge Carb counting with meals approx 20 units daily 4/19/16  Yes Taylor Bello PA-C   glucagon (GLUCAGON EMERGENCY) 1 MG injection Use in event of hypoglycemia 3/21/16  Yes Alis Slaughter MD   predniSONE (DELTASONE) 2.5 MG tablet Take 1 tablet (2.5 mg) by mouth daily 3/14/16  Yes Daija Mccann MD   alendronate (FOSAMAX) 70 MG tablet Take 1 tablet (70 mg) by mouth every 7 days 11/3/15  Yes Alis Slaughter MD   insulin glargine (LANTUS) 100 UNIT/ML vial Inject 4 units in the AM; inject 6 units in the PM. 8/25/15  Yes Precious Bahena PA-C   Injection Device for insulin (HUMAPEN LUXURA HD) ROBINSON 1 each 5 times daily Use as directed with Humalog 5/22/15  Yes Taylor Bello PA-C   aspirin  MG tablet Take 1 tablet (325 mg) by mouth daily 10/23/13  Yes Isauro Mehta MD   calcium 500-125 MG-UNIT TABS Take  by mouth. Plus D take one daily   Yes Reported, Patient   brimonidine-timolol (COMBIGAN) 0.2-0.5 % ophthalmic solution Place 1 drop into the right eye 2 times daily NOT ON HOLD   Yes Unknown, Entered By History   Multiple Vitamin (MULTIVITAMIN OR) Take 1 tablet by mouth daily. 4/25/11  Yes Reported, Patient   brinzolamide (AZOPT) 1 % ophthalmic suspension Place 1 drop into the right eye 2 times daily. 4/25/11  Yes Reported, Patient       Vitals:  /71 (BP Location: Right arm, Patient Position: Chair, Cuff Size: Adult Small)  Pulse 62  Temp 97.5  F (36.4  C) (Oral)  Resp 18  Ht 1.702 m (5' 7\")  Wt 67.2 " kg (148 lb 1.6 oz)  BMI 23.2 kg/m2    Exam:   GENERAL APPEARANCE: alert and no distress  HENT: mouth without ulcers or lesions  LYMPHATICS: no cervical or supraclavicular nodes  RESP: lungs clear to auscultation - no rales, rhonchi or wheezes  CV: regular rhythm, normal rate, no rub, 2/6 systolic murmur  EDEMA: 2+ LE edema bilaterally  ABDOMEN: soft, nondistended, nontender, bowel sounds normal, medium-sized ventral hernia  MS: extremities normal - no gross deformities noted, no evidence of inflammation in joints, no muscle tenderness  SKIN: no rash  TX KIDNEY: normal    Results:   Recent Results (from the past 504 hour(s))   CBC with platelets    Collection Time: 06/06/17  9:49 AM   Result Value Ref Range    WBC 4.7 4.0 - 11.0 10e9/L    RBC Count 3.53 (L) 4.4 - 5.9 10e12/L    Hemoglobin 10.6 (L) 13.3 - 17.7 g/dL    Hematocrit 33.3 (L) 40.0 - 53.0 %    MCV 94 78 - 100 fl    MCH 30.0 26.5 - 33.0 pg    MCHC 31.8 31.5 - 36.5 g/dL    RDW 17.5 (H) 10.0 - 15.0 %    Platelet Count 182 150 - 450 10e9/L   Basic metabolic panel    Collection Time: 06/06/17  9:49 AM   Result Value Ref Range    Sodium 137 133 - 144 mmol/L    Potassium 4.4 3.4 - 5.3 mmol/L    Chloride 105 94 - 109 mmol/L    Carbon Dioxide 21 20 - 32 mmol/L    Anion Gap 10 3 - 14 mmol/L    Glucose 181 (H) 70 - 99 mg/dL    Urea Nitrogen 67 (H) 7 - 30 mg/dL    Creatinine 2.95 (H) 0.66 - 1.25 mg/dL    GFR Estimate 22 (L) >60 mL/min/1.7m2    GFR Estimate If Black 27 (L) >60 mL/min/1.7m2    Calcium 7.6 (L) 8.5 - 10.1 mg/dL   Amylase    Collection Time: 06/06/17  9:49 AM   Result Value Ref Range    Amylase 40 30 - 110 U/L   Lipase    Collection Time: 06/06/17  9:49 AM   Result Value Ref Range    Lipase 96 73 - 393 U/L   Lipid Profile    Collection Time: 06/06/17  9:49 AM   Result Value Ref Range    Cholesterol 100 <200 mg/dL    Triglycerides 68 <150 mg/dL    HDL Cholesterol 37 (L) >39 mg/dL    LDL Cholesterol Calculated 50 <100 mg/dL    Non HDL Cholesterol 64 <130  mg/dL   Hemoglobin A1c    Collection Time: 06/06/17  9:49 AM   Result Value Ref Range    Hemoglobin A1C 6.1 (H) 4.3 - 6.0 %   Protein  random urine    Collection Time: 06/06/17  9:55 AM   Result Value Ref Range    Protein Random Urine 0.56 g/L    Protein Total Urine g/gr Creatinine 1.87 (H) 0 - 0.2 g/g Cr   Creatinine urine calculation only    Collection Time: 06/06/17  9:55 AM   Result Value Ref Range    Creatinine Urine 30 mg/dL           Arnoldo Zaman MD

## 2017-06-06 NOTE — NURSING NOTE
"Chief Complaint   Patient presents with     RECHECK     Kidney follow up       Initial /71 (BP Location: Right arm, Patient Position: Chair, Cuff Size: Adult Small)  Pulse 62  Temp 97.5  F (36.4  C) (Oral)  Resp 18  Ht 1.702 m (5' 7\")  Wt 67.2 kg (148 lb 1.6 oz)  BMI 23.2 kg/m2 Estimated body mass index is 23.2 kg/(m^2) as calculated from the following:    Height as of this encounter: 1.702 m (5' 7\").    Weight as of this encounter: 67.2 kg (148 lb 1.6 oz).  Medication Reconciliation: complete   BEATRIZ MAYA CMA      "

## 2017-06-06 NOTE — PROGRESS NOTES
Assessment and Plan:  1. LDKT - baseline Cr ~ 1.9-2.2, which has increased with last couple of lab checks, now up to 2.9 today.  Kidney transplant biopsy 9/2015 showed moderate to severe chronic changes with CAN and diabetic nephropathy.  Increased proteinuria, now moderate at almost 2 grams.  Positive DSA in the past.  Most recent tacrolimus level has been running lower than goal and with change in immunosuppression, concerned that higher creatinine could be an indication for acute rejection versus progression of chronic changes.  Would recommend a kidney transplant biopsy.  Patient would prefer to get that done at home by his local Nephrologist and will see if that can happen.  Will hold off on any changes in immunosuppression at this time, except to increase tacrolimus to goal.  2. HTN - okay control at target of less than 140/90 at clinic visits, but seems to be running higher at home.  With significant edema, will increase furosemide to 60 mg bid.  Patient to follow up with PCP and/or primary Nephrologist.  Recommend patient bring in his BP cuff to his local clinic to ensure it is accurate.  3. DM, s/p failed pancreas transplant - improved control with last HbA1c 6.1%, possibly from worsening kidney function.  4. Anemia in chronic renal disease - improved Hgb on MANDY.  Iron replete.  Will defer to primary Nephrologist to manage.  5. Secondary renal hyperparathyroidism - minimal increase in PTH and will continue on calcitriol.  Will defer to primary Nephrologist to manage.  6. Vitamin D deficiency - low vitamin D level and continue ergocalciferol.  Will defer to primary Nephrologist to manage.  7. Hypocalcemia - low serum calcium level and recommend increasing oral calcium supplement to twice daily.  8. Metabolic acidosis - slightly low, stable serum bicarbonate level.  Will continue on sodium bicarbonate supplement.  Will defer to primary Nephrologist to manage.  9. Diabetic gastroparesis - much improved  symptoms off mycophenolic acid.  Patient to follow up with GI as needed.  10. Chronic diarrhea - resolved after change from mycophenolic acid to azathioprine.  11. Weight loss - stable to improved weight after resolution of diarrhea.  12. Skin cancer risk - no new skin lesions.  Recommend regular follow up with Dermatology.  13. Recommend return visit in 8 months.    Assessment and plan was discussed with patient and he voiced his understanding and agreement.    Reason for Visit:  Mr. Juan is here for routine follow up.    HPI:   Lorne Juan is a 56 year old male with ESKD from DM and is status post LDKT on 2/15/91.         Transplant Hx:       Tx: LDKT  Date: 2/15/91       Present Maintenance IS: Tacrolimus, Azathioprine and Prednisone       Baseline Creatinine: 1.9-2.2       Recent DSA: ?  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    Mr. Juan reports feeling okay overall with some medical complaints.  His energy level is lower as he has tiredness and fatigue, but no recent change.  He is active and does get a little exercise, mostly with walking his dog.  Denies any chest pain or shortness of breath with exertion.  Appetite is okay and weight has been mostly stable.  No nausea or vomiting.  No further diarrhea after being changed from mycophenolic acid to azathioprine, along with use of Imodium as needed.  No fever, sweats or chills.  Some increase in leg swelling recently.    Home BP: 140-150/70s at home, but reports much lower readings during recent clinic visits.      ROS:   A comprehensive review of systems was obtained and negative, except as noted in the HPI or PMH.    Active Medical Problems:  Patient Active Problem List   Diagnosis     Status post pancreas transplantation (H)      Thrombocytopenia (H)     Vitamin D deficiency     Osteoporosis     Disorder of bone and cartilage     Diabetes mellitus type 1 (H)     Kidney replaced by transplant     Immunosuppressed status (H)     Anemia in chronic renal disease     Metabolic acidosis     Aftercare following organ transplant     Secondary renal hyperparathyroidism (H)     Diabetic gastroparesis (H)     Hypertension secondary to other renal disorders       Personal Hx:  Social History     Social History     Marital status: Single     Spouse name: N/A     Number of children: N/A     Years of education: N/A     Occupational History     Not on file.     Social History Main Topics     Smoking status: Never Smoker     Smokeless tobacco: Never Used     Alcohol use Yes      Comment: Rare     Drug use: No     Sexual activity: Not on file     Other Topics Concern     Not on file     Social History Narrative       Allergies:  Allergies   Allergen Reactions     Bactrim Diarrhea     Benadryl [Altaryl]      hyperactivity     Diamox [Acetazolamide Sodium] Other (See Comments)     Light headedness     Morphine Sulfate Itching       Medications:  Prior to Admission medications    Medication Sig Start Date End Date Taking? Authorizing Provider   azaTHIOprine 75 MG TABS Take 75 mg by mouth daily 12/20/16  Yes Arnoldo Zaman MD   sodium bicarbonate 650 MG tablet Take 1 tablet (650 mg) by mouth 2 times daily 12/20/16  Yes Arnoldo Zaman MD   LANTUS SOLOSTAR 100 UNIT/ML soln Inject 4 units in the AM; inject 6 units in the PM 11/22/16  Yes Taylor Bello PA-C   losartan (COZAAR) 25 MG tablet Take 2 tablets (50 mg) by mouth daily 11/21/16  Yes Arnoldo Zaman MD   blood glucose monitoring (NO BRAND SPECIFIED) test strip Test 6 times per day with  Prodigy no  Coding  Glucose strips . 11/11/16  Yes Taylor Bello PA-C   insulin pen needle (BD MARQUISE U/F) 32G X 4 MM Use 4-6 daily or as directed. 11/11/16  Yes Taylor Bello PA-C   furosemide  "(LASIX) 20 MG tablet Take 1 tablet (20 mg) by mouth 2 times daily 8/16/16  Yes Arnoldo Zaman MD   carvedilol (COREG) 6.25 MG tablet Take 1 tablet (6.25 mg) by mouth 2 times daily 8/15/16  Yes Arnoldo Zaman MD   tacrolimus (PROGRAF - GENERIC EQUIVALENT) 0.5 MG capsule Take 1 capsule (0.5 mg) by mouth 2 times daily 6/22/16  Yes Daija Mccann MD   insulin lispro (HUMALOG) 100 UNIT/ML Cartridge Carb counting with meals approx 20 units daily 4/19/16  Yes Taylor Bello PA-C   glucagon (GLUCAGON EMERGENCY) 1 MG injection Use in event of hypoglycemia 3/21/16  Yes Alis Slaughter MD   predniSONE (DELTASONE) 2.5 MG tablet Take 1 tablet (2.5 mg) by mouth daily 3/14/16  Yes Daija Mccann MD   alendronate (FOSAMAX) 70 MG tablet Take 1 tablet (70 mg) by mouth every 7 days 11/3/15  Yes Alis Slaughter MD   insulin glargine (LANTUS) 100 UNIT/ML vial Inject 4 units in the AM; inject 6 units in the PM. 8/25/15  Yes Precious Bahena PA-C   Injection Device for insulin (HUMAPEN LUXURA HD) ROBINSON 1 each 5 times daily Use as directed with Humalog 5/22/15  Yes Taylor Bello PA-C   aspirin  MG tablet Take 1 tablet (325 mg) by mouth daily 10/23/13  Yes Isauro Mehta MD   calcium 500-125 MG-UNIT TABS Take  by mouth. Plus D take one daily   Yes Reported, Patient   brimonidine-timolol (COMBIGAN) 0.2-0.5 % ophthalmic solution Place 1 drop into the right eye 2 times daily NOT ON HOLD   Yes Unknown, Entered By History   Multiple Vitamin (MULTIVITAMIN OR) Take 1 tablet by mouth daily. 4/25/11  Yes Reported, Patient   brinzolamide (AZOPT) 1 % ophthalmic suspension Place 1 drop into the right eye 2 times daily. 4/25/11  Yes Reported, Patient       Vitals:  /71 (BP Location: Right arm, Patient Position: Chair, Cuff Size: Adult Small)  Pulse 62  Temp 97.5  F (36.4  C) (Oral)  Resp 18  Ht 1.702 m (5' 7\")  Wt 67.2 kg (148 lb 1.6 oz)  BMI 23.2 kg/m2    Exam:   GENERAL " APPEARANCE: alert and no distress  HENT: mouth without ulcers or lesions  LYMPHATICS: no cervical or supraclavicular nodes  RESP: lungs clear to auscultation - no rales, rhonchi or wheezes  CV: regular rhythm, normal rate, no rub, 2/6 systolic murmur  EDEMA: 2+ LE edema bilaterally  ABDOMEN: soft, nondistended, nontender, bowel sounds normal, medium-sized ventral hernia  MS: extremities normal - no gross deformities noted, no evidence of inflammation in joints, no muscle tenderness  SKIN: no rash  TX KIDNEY: normal    Results:   Recent Results (from the past 504 hour(s))   CBC with platelets    Collection Time: 06/06/17  9:49 AM   Result Value Ref Range    WBC 4.7 4.0 - 11.0 10e9/L    RBC Count 3.53 (L) 4.4 - 5.9 10e12/L    Hemoglobin 10.6 (L) 13.3 - 17.7 g/dL    Hematocrit 33.3 (L) 40.0 - 53.0 %    MCV 94 78 - 100 fl    MCH 30.0 26.5 - 33.0 pg    MCHC 31.8 31.5 - 36.5 g/dL    RDW 17.5 (H) 10.0 - 15.0 %    Platelet Count 182 150 - 450 10e9/L   Basic metabolic panel    Collection Time: 06/06/17  9:49 AM   Result Value Ref Range    Sodium 137 133 - 144 mmol/L    Potassium 4.4 3.4 - 5.3 mmol/L    Chloride 105 94 - 109 mmol/L    Carbon Dioxide 21 20 - 32 mmol/L    Anion Gap 10 3 - 14 mmol/L    Glucose 181 (H) 70 - 99 mg/dL    Urea Nitrogen 67 (H) 7 - 30 mg/dL    Creatinine 2.95 (H) 0.66 - 1.25 mg/dL    GFR Estimate 22 (L) >60 mL/min/1.7m2    GFR Estimate If Black 27 (L) >60 mL/min/1.7m2    Calcium 7.6 (L) 8.5 - 10.1 mg/dL   Amylase    Collection Time: 06/06/17  9:49 AM   Result Value Ref Range    Amylase 40 30 - 110 U/L   Lipase    Collection Time: 06/06/17  9:49 AM   Result Value Ref Range    Lipase 96 73 - 393 U/L   Lipid Profile    Collection Time: 06/06/17  9:49 AM   Result Value Ref Range    Cholesterol 100 <200 mg/dL    Triglycerides 68 <150 mg/dL    HDL Cholesterol 37 (L) >39 mg/dL    LDL Cholesterol Calculated 50 <100 mg/dL    Non HDL Cholesterol 64 <130 mg/dL   Hemoglobin A1c    Collection Time: 06/06/17  9:49  AM   Result Value Ref Range    Hemoglobin A1C 6.1 (H) 4.3 - 6.0 %   Protein  random urine    Collection Time: 06/06/17  9:55 AM   Result Value Ref Range    Protein Random Urine 0.56 g/L    Protein Total Urine g/gr Creatinine 1.87 (H) 0 - 0.2 g/g Cr   Creatinine urine calculation only    Collection Time: 06/06/17  9:55 AM   Result Value Ref Range    Creatinine Urine 30 mg/dL

## 2017-06-07 LAB
BKV DNA # SPEC NAA+PROBE: NORMAL COPIES/ML
BKV DNA SPEC NAA+PROBE-LOG#: NORMAL LOG COPIES/ML
PRA DONOR SPECIFIC ABY: NORMAL
SPECIMEN SOURCE: NORMAL

## 2017-06-08 ENCOUNTER — TELEPHONE (OUTPATIENT)
Dept: TRANSPLANT | Facility: CLINIC | Age: 57
End: 2017-06-08

## 2017-06-08 DIAGNOSIS — Z79.899 OTHER LONG TERM (CURRENT) DRUG THERAPY: ICD-10-CM

## 2017-06-08 DIAGNOSIS — Z94.0 KIDNEY REPLACED BY TRANSPLANT: Primary | ICD-10-CM

## 2017-06-08 NOTE — TELEPHONE ENCOUNTER
Spoke with patient about his upcoming kidney biopsy.  Patient willing to come to the [facility] for his kidney biopsy.  Biopsy on June 16th.  Please order biopsy and instruct patient where to go.

## 2017-06-09 ENCOUNTER — CARE COORDINATION (OUTPATIENT)
Dept: NEPHROLOGY | Facility: CLINIC | Age: 57
End: 2017-06-09

## 2017-06-09 DIAGNOSIS — Z94.0 KIDNEY REPLACED BY TRANSPLANT: Primary | ICD-10-CM

## 2017-06-09 NOTE — PROGRESS NOTES
Received voicemail from patient stating he has questions regarding upcoming transplant kidney biopsy.    Spoke with patient. Said he is a bit concerned about why the biopsy is necessary and how much information will be gained this far out from transplant. Wants to know if Dr. Zaman consulted with other providers to determine need. Said he was told when hospitalized in March a biopsy may not reveal much as he's 26 years post transplant. And since he's been told he's high risk for another pancreas transplant, he doesn't know if/how he'd qualify for a kidney transplant. Overall doesn't really know if biopsy is needed, requesting further recommendations. Update sent to Dr. Zaman and transplant coordinator.    Wendy Padilla RN

## 2017-06-13 ENCOUNTER — TELEPHONE (OUTPATIENT)
Dept: TRANSPLANT | Facility: CLINIC | Age: 57
End: 2017-06-13

## 2017-06-13 NOTE — TELEPHONE ENCOUNTER
Spoke with patient.  He is apprehensive about his upcoming kidney biopsy.  Given instructions about eating before biopsy.  Not taking immunosuppression prior to biopsy.  Patient is currently not taking anticoagulation.  Patient has a few questions for Dr. Zaman.  Patient will ask questions prior to biopsy.

## 2017-06-14 ENCOUNTER — TELEPHONE (OUTPATIENT)
Dept: TRANSPLANT | Facility: CLINIC | Age: 57
End: 2017-06-14

## 2017-06-14 NOTE — TELEPHONE ENCOUNTER
Spoke with patient this afternoon.  He has decided not to have a kidney biopsy.  Explanation was given that the biopsy may benefit extending the life of the kidney.  Discussed increasing Creatinine and protein.  Patient does not feel there is enough statistical data at present to have the biopsy.  Patient will complete labs monthly.  If creatinine continues to climb patient may decide to have the biopsy.

## 2017-06-22 DIAGNOSIS — I10 HTN (HYPERTENSION): ICD-10-CM

## 2017-06-22 LAB
DONOR IDENTIFICATION: NORMAL
DSA COMMENTS: NORMAL
DSA PRESENT: NORMAL
DSA TEST METHOD: NORMAL
Lab: 2034
Lab: 9416
ORGAN: NORMAL
SA1 CELL: NORMAL
SA1 COMMENTS: NORMAL
SA1 HI RISK ABY: NORMAL
SA1 MOD RISK ABY: NORMAL
SA1 TEST METHOD: NORMAL
SA2 CELL: NORMAL
SA2 COMMENTS: NORMAL
SA2 HI RISK ABY UA: NORMAL
SA2 MOD RISK ABY: NORMAL
SA2 TEST METHOD: NORMAL

## 2017-06-22 RX ORDER — AMLODIPINE BESYLATE 5 MG/1
5 TABLET ORAL DAILY
Qty: 90 TABLET | Refills: 0 | Status: SHIPPED | OUTPATIENT
Start: 2017-06-22

## 2017-06-26 ENCOUNTER — TELEPHONE (OUTPATIENT)
Dept: TRANSPLANT | Facility: CLINIC | Age: 57
End: 2017-06-26

## 2017-06-26 DIAGNOSIS — Z94.0 KIDNEY REPLACED BY TRANSPLANT: Primary | ICD-10-CM

## 2017-06-26 NOTE — LETTER
OUTPATIENT LABORATORY TEST ORDER:  After First Year    Patient Name: Lorne Juan                           Transplant Date: 02-  YOB: 1960                                                Issue Date & Time:June 27, 20179:13 AM    [facility] MR: [id number]                                                Exp. Date (1 year after date issued)    Diagnoses:   Kidney Transplant (ICD-10 Z94.0)      Long term use of medications (ICD-10  Z79.899)             Lab results to be available on the same day drawn.   Please release results to patient upon their request    Please fax to the Transplant Center at [phone].    Every 2 Months   Complete Blood Count with Platelets    Basic Metabolic Panel (Sodium, Potassium, Chloride, CO2, Creatinine, Urea Nitrogen, Glucose, Calcium)   /Tacrolimus/Prograf drug level      Every 6 months,     Due:  and             Complete Lipid Panel fasting (Cholesterol, Triglycerides, HDL, LDL)            Glycosylated Hemoglobin (A1c)              Urine for Protein/Creatinine        If you have any questions, please call [facility] at [phone] or [phone].      .

## 2017-06-26 NOTE — TELEPHONE ENCOUNTER
Spoke with patient this morning.  He needs monthly labs sent to his lab.  Please send off lab orders.  Fax number [phone]

## 2017-07-10 ENCOUNTER — TELEPHONE (OUTPATIENT)
Dept: TRANSPLANT | Facility: CLINIC | Age: 57
End: 2017-07-10

## 2017-07-10 DIAGNOSIS — Z94.0 KIDNEY REPLACED BY TRANSPLANT: Primary | ICD-10-CM

## 2017-07-10 DIAGNOSIS — Z79.899 OTHER LONG TERM (CURRENT) DRUG THERAPY: ICD-10-CM

## 2017-07-10 NOTE — TELEPHONE ENCOUNTER
ISSUE:  Pt cr 2.98. Elevation in creatine over last 9 months (1.6>>2.98) pt had low tac levels recently, could correlate.   Chart review shows pt was recommended to have kidney txp biopsy per Dr. Zaman - pt reluctant d/t not believing there is enough evidence to support doing a biopsy.     PLAN:   Call to pt to discuss acute vs chronic changes possibly happening to his kidney and treatment options. Pt would like to wait for his sister to return from [place] (august) to discuss. Writer strongly encouraged pt to have the biopsy sooner than later to preserve kidney function - he states understanding and will contact his sister to discuss as well as his mother and brothr-in-law who [place]. Pt will call writer 7/11/17 for f/u.

## 2017-07-11 DIAGNOSIS — Z94.0 KIDNEY REPLACED BY TRANSPLANT: Primary | ICD-10-CM

## 2017-07-11 NOTE — TELEPHONE ENCOUNTER
Please call Lorne and determine when he is next available for biopsy. Should be this completed week or next.

## 2017-07-11 NOTE — TELEPHONE ENCOUNTER
Spoke to patient and he wishes to pursue renal biopsy on 07-18-17, aware of time, where to report, use , can eat prior but not to take transplant medicine until after lab draw, after labs drawn report to 5th floor, bring entertainment. Does not take ASA, coumadin, plavix or fish oil. All questions were answered.  Patient would like coord to speak to his sister but she isn't available until Monday, advised to have sister call coord on Monday.

## 2017-07-17 ENCOUNTER — TELEPHONE (OUTPATIENT)
Dept: TRANSPLANT | Facility: CLINIC | Age: 57
End: 2017-07-17

## 2017-07-17 NOTE — TELEPHONE ENCOUNTER
"Callback to pt sister Terri - she is immediately very angry and demanding writer explain indication for a biopsy. Writer explained the same as to pt (see note) discussing labs, IS, and illnesses over the last 9 months and MD recommendation to biopsy and if acute component to treat and hopefully prolong life of kidney. \"Whats the point? You're spending my money on this, his kidney's dead, who cares? He'll be back on dialysis in 3 years, you're not getting my approval...\" Writer suggested we speak with social work, pt sister declined and stated pt is on disability and \"it won't help, he's spending your money, tax payers money on this\". Writer explained the biopsy procedure, she interrupted \"I know all about it, I know what it is I don't know why you would /want to prolong this kidney, it's dead\". Pt sister then hung up on writer. Pt is scheduled for biopsy 7/18  "

## 2017-07-17 NOTE — TELEPHONE ENCOUNTER
Please call sister Terri Mcrae # [phone]    She really wants to know why her brother Lorne needs Renal Biopsy  Scheduled for tomorrow 07/18/17. Feels Lorne has been pressured into this.

## 2017-07-17 NOTE — TELEPHONE ENCOUNTER
Callback to pt: We discussed conversation with sister, and calmly discussed previous recommendations and went over biopsy procedure as well as social work options. He is thankful, asks appropriate questions related to his kidney and the biopsy - all questions answered.

## 2017-07-17 NOTE — TELEPHONE ENCOUNTER
D: Informed by Karyna Toure, transplant MA, patient in need of financial assistance with lodging after his biopsy.  I/A: This writer contacted patient to discuss.  Lodging is not covered by his insurance and he indicated he is unable to afford the night of lodging.  Will use transplant funds to pay for one night at [facility].Informed patient that a room is under his name for 7/18/2017 but did not want the confirmation number.    P: SW will remain available if further needs are identified.

## 2017-07-18 ENCOUNTER — RESULTS ONLY (OUTPATIENT)
Dept: OTHER | Facility: CLINIC | Age: 57
End: 2017-07-18

## 2017-07-18 ENCOUNTER — SURGERY (OUTPATIENT)
Age: 57
End: 2017-07-18

## 2017-07-18 ENCOUNTER — HOSPITAL ENCOUNTER (OUTPATIENT)
Facility: AMBULATORY SURGERY CENTER | Age: 57
End: 2017-07-18
Attending: INTERNAL MEDICINE

## 2017-07-18 ENCOUNTER — TELEPHONE (OUTPATIENT)
Dept: TRANSPLANT | Facility: CLINIC | Age: 57
End: 2017-07-18

## 2017-07-18 VITALS
TEMPERATURE: 97.2 F | RESPIRATION RATE: 14 BRPM | OXYGEN SATURATION: 98 % | HEART RATE: 66 BPM | SYSTOLIC BLOOD PRESSURE: 152 MMHG | DIASTOLIC BLOOD PRESSURE: 76 MMHG

## 2017-07-18 DIAGNOSIS — Z79.899 OTHER LONG TERM (CURRENT) DRUG THERAPY: ICD-10-CM

## 2017-07-18 DIAGNOSIS — Z94.0 KIDNEY REPLACED BY TRANSPLANT: ICD-10-CM

## 2017-07-18 LAB
ABO + RH BLD: NORMAL
ABO + RH BLD: NORMAL
ALBUMIN UR-MCNC: 100 MG/DL
ANION GAP SERPL CALCULATED.3IONS-SCNC: 8 MMOL/L (ref 3–14)
APPEARANCE UR: CLEAR
BASOPHILS # BLD AUTO: 0 10E9/L (ref 0–0.2)
BASOPHILS NFR BLD AUTO: 0.5 %
BILIRUB UR QL STRIP: NEGATIVE
BLD GP AB SCN SERPL QL: NORMAL
BLOOD BANK CMNT PATIENT-IMP: NORMAL
BUN SERPL-MCNC: 63 MG/DL (ref 7–30)
CALCIUM SERPL-MCNC: 8.3 MG/DL (ref 8.5–10.1)
CHLORIDE SERPL-SCNC: 99 MMOL/L (ref 94–109)
CMV DNA SPEC NAA+PROBE-ACNC: NORMAL [IU]/ML
CMV DNA SPEC NAA+PROBE-LOG#: NORMAL {LOG_IU}/ML
CO2 SERPL-SCNC: 25 MMOL/L (ref 20–32)
COLOR UR AUTO: ABNORMAL
CREAT SERPL-MCNC: 2.59 MG/DL (ref 0.66–1.25)
CREAT UR-MCNC: 26 MG/DL
DIFFERENTIAL METHOD BLD: ABNORMAL
EOSINOPHIL # BLD AUTO: 0.1 10E9/L (ref 0–0.7)
EOSINOPHIL NFR BLD AUTO: 3.5 %
ERYTHROCYTE [DISTWIDTH] IN BLOOD BY AUTOMATED COUNT: 15 % (ref 10–15)
ERYTHROCYTE [DISTWIDTH] IN BLOOD BY AUTOMATED COUNT: 15 % (ref 10–15)
GFR SERPL CREATININE-BSD FRML MDRD: 26 ML/MIN/1.7M2
GLUCOSE SERPL-MCNC: 44 MG/DL (ref 70–99)
GLUCOSE UR STRIP-MCNC: NEGATIVE MG/DL
HCT VFR BLD AUTO: 29.1 % (ref 40–53)
HCT VFR BLD AUTO: 33.3 % (ref 40–53)
HGB BLD-MCNC: 10.1 G/DL (ref 13.3–17.7)
HGB BLD-MCNC: 11 G/DL (ref 13.3–17.7)
HGB BLD-MCNC: 9.7 G/DL (ref 13.3–17.7)
HGB UR QL STRIP: NEGATIVE
IMM GRANULOCYTES # BLD: 0 10E9/L (ref 0–0.4)
IMM GRANULOCYTES NFR BLD: 0.3 %
INR PPP: 1.24 (ref 0.86–1.14)
KETONES UR STRIP-MCNC: NEGATIVE MG/DL
LEUKOCYTE ESTERASE UR QL STRIP: NEGATIVE
LYMPHOCYTES # BLD AUTO: 0.7 10E9/L (ref 0.8–5.3)
LYMPHOCYTES NFR BLD AUTO: 19.8 %
MCH RBC QN AUTO: 30 PG (ref 26.5–33)
MCH RBC QN AUTO: 31.2 PG (ref 26.5–33)
MCHC RBC AUTO-ENTMCNC: 33 G/DL (ref 31.5–36.5)
MCHC RBC AUTO-ENTMCNC: 34.7 G/DL (ref 31.5–36.5)
MCV RBC AUTO: 90 FL (ref 78–100)
MCV RBC AUTO: 91 FL (ref 78–100)
MICROALBUMIN UR-MCNC: 476 MG/L
MICROALBUMIN/CREAT UR: 1859.38 MG/G CR (ref 0–17)
MONOCYTES # BLD AUTO: 0.5 10E9/L (ref 0–1.3)
MONOCYTES NFR BLD AUTO: 12.3 %
MUCOUS THREADS #/AREA URNS LPF: PRESENT /LPF
NEUTROPHILS # BLD AUTO: 2.4 10E9/L (ref 1.6–8.3)
NEUTROPHILS NFR BLD AUTO: 63.6 %
NITRATE UR QL: NEGATIVE
NRBC # BLD AUTO: 0 10*3/UL
NRBC BLD AUTO-RTO: 0 /100
PH UR STRIP: 8 PH (ref 5–7)
PLATELET # BLD AUTO: 170 10E9/L (ref 150–450)
PLATELET # BLD AUTO: 199 10E9/L (ref 150–450)
POTASSIUM SERPL-SCNC: 4.2 MMOL/L (ref 3.4–5.3)
PROT UR-MCNC: 0.66 G/L
PROT/CREAT 24H UR: 2.59 G/G CR (ref 0–0.2)
RBC # BLD AUTO: 3.24 10E12/L (ref 4.4–5.9)
RBC # BLD AUTO: 3.67 10E12/L (ref 4.4–5.9)
RBC #/AREA URNS AUTO: 1 /HPF (ref 0–2)
SODIUM SERPL-SCNC: 132 MMOL/L (ref 133–144)
SP GR UR STRIP: 1 (ref 1–1.03)
SPECIMEN EXP DATE BLD: NORMAL
SPECIMEN SOURCE: NORMAL
TACROLIMUS BLD-MCNC: 3.6 UG/L (ref 5–15)
TME LAST DOSE: 1940 H
URN SPEC COLLECT METH UR: ABNORMAL
UROBILINOGEN UR STRIP-MCNC: 0 MG/DL (ref 0–2)
WBC # BLD AUTO: 3.7 10E9/L (ref 4–11)
WBC # BLD AUTO: 4.3 10E9/L (ref 4–11)
WBC #/AREA URNS AUTO: 0 /HPF (ref 0–2)

## 2017-07-18 RX ORDER — ONDANSETRON 4 MG/1
4 TABLET, ORALLY DISINTEGRATING ORAL ONCE
Status: DISCONTINUED | OUTPATIENT
Start: 2017-07-18 | End: 2017-07-19 | Stop reason: HOSPADM

## 2017-07-18 NOTE — PROCEDURES
Kidney Transplant Biopsy Procedure Note    Indication/Diagnosis:  Kidney transplant with elevated creatinine    Procedure:  The indications and risks of the kidney biopsy, including bleeding severe enough to require hospitalization, transfusion, surgery, or even loss of the kidney or death, were explained, understood and agreed.  Consent was signed.  The kidney, located in the left lower quadrant, was visualized under ultrasound and biopsy site marked.  Patient was prepped and draped in the usual sterile manner.  Biopsy site was anesthetized with 1% lidocaine.  Biopsy consisted of 3 passes with a 18 ga needle under direct ultrasound guidance were made and tissue was sent to pathology.  There were no immediate complications.  Pressure was held over biopsy site and patient will be observed for signs of bleeding or other complications.  Patient remained hemodynamically stable during and early post procedure.

## 2017-07-18 NOTE — TELEPHONE ENCOUNTER
DATE:  7/18/2017   TIME OF RECEIPT FROM LAB:  7:30 AM  LAB TEST:  Glucose  LAB VALUE:  44  RESULTS GIVEN WITH READ-BACK TO (PROVIDER):  Routed to:  GUILLERMO Vences RN  TIME LAB VALUE REPORTED TO PROVIDER:   7:41 AM

## 2017-07-18 NOTE — IP AVS SNAPSHOT
MRN:[id number]                      After Visit Summary   7/18/2017    Lorne Juan    MRN: [id number]           Thank you!     Thank you for choosing [facility] for your care. Our goal is always to provide you with excellent care. Hearing back from our patients is one way we can continue to improve our services. Please take a few minutes to complete the written survey that you may receive in the mail after you visit with us. Thank you!        Patient Information     Date Of Birth          1960        About your hospital stay     You were admitted on:  July 18, 2017 You last received care in the:  [facility]    You were discharged on:  July 18, 2017       Who to Call     For medical emergencies, please call 911.  For non-urgent questions about your medical care, please call your primary care provider or clinic, [phone]  For questions related to your surgery, please call your surgery clinic        Attending Provider     Provider John Nelson MD Internal Medicine       Primary Care Provider Office Phone # Fax #    Bob J Margarethjeski [phone]      After Care Instructions     Discharge Instructions       No aspirin products or NSAIDs for 7 days after kidney biopsy. Provider will determine when to start (warfarin)  Coumadin, (clopidogrel) Plavix or other blood thinner if appropriate                  Your next 10 appointments already scheduled     Feb 12, 2018 12:30 PM CST   Lab with  LAB   [facility] ([facility])    [address] [place] [id number] [phone]            Feb 12, 2018  1:30 PM CST   (Arrive by 1:00 PM)   Return Kidney Transplant with Arnoldo Zaman MD   [facility] ([facility])    [address] [place] [id number] [phone]              Further instructions from your care team        [facility]  Home Care Following Kidney Biopsy  ACTIVITY: NO heavy lifting (weight greater than 10 pounds) for 1 week. NO strenuous activity for 24 hours; relax and take it easy.     DIET: Resume your regular diet and drink plenty of fluids UNLESS you are fluid restricted.    DRAINAGE: There should be minimal drainage from the biopsy site. If bleeding soaks the dressing, you should lie down and apply pressure to the site for a minimum of 10 minutes. Call one of the numbers below if experienced bleeding that soaked the dressing.     DRESSING: Keep the dressing in place for 24 hours to prevent the site from re-opening and bleeding.     SEDATION: IF you received sedation medications, DO NOT drive or operate heavy machinery, DO NOT drink alcoholic beverages, and DO NOT make important legal decisions.    CALL ONE OF THE NUMBERS BELOW IF YOU EXPERIENCE ANY ONE OF THE FOLLOWING:      Excessive bleeding or drainage    Excessive swelling, redness, or tenderness at the site    Fever above 100.5 F, orally    Severe pain    Drainage that is green, yellow, thick white, or has a bad odor    If you havebloody urine or see bloody clots in your urine     Emergency Department: [phone] (open 24 hours)  Transplant Center: [phone] (open 7:00 AM - 6:30 PM)   [facility]  Home Care Following Kidney Biopsy  ACTIVITY: NO heavy lifting (weight greater than 10 pounds) for 1 week. NO strenuous activity for 24 hours; relax and take it easy.     DIET: Resume your regular diet and drink plenty of fluids UNLESS you are fluid restricted.    DRAINAGE: There should be minimal drainage from the biopsy site. If bleeding soaks the dressing, you should lie down and apply pressure to the site for a minimum of 10 minutes. Call one of the numbers below if experienced bleeding that soaked the dressing.     DRESSING: Keep the dressing in place for 24 hours to prevent the site  from re-opening and bleeding.     SEDATION: IF you received sedation medications, DO NOT drive or operate heavy machinery, DO NOT drink alcoholic beverages, and DO NOT make important legal decisions.    CALL ONE OF THE NUMBERS BELOW IF YOU EXPERIENCE ANY ONE OF THE FOLLOWING:      Excessive bleeding or drainage    Excessive swelling, redness, or tenderness at the site    Fever above 100.5 F, orally    Severe pain    Drainage that is green, yellow, thick white, or has a bad odor    If you havebloody urine or see bloody clots in your urine     Emergency Department: [phone] (open 24 hours)  Transplant Center: [phone] (open 7:00 AM - 6:30 PM)       Pending Results     Date and Time Order Name Status Description    7/18/2017 0929 Surgical pathology exam In process     7/18/2017 0604 TACROLIMUS LEVEL In process     7/18/2017 0604 BK VIRUS PCR QUANTITATIVE In process     7/18/2017 0604 URINE CULTURE AEROBIC BACTERIAL Preliminary     7/18/2017 0604 CMV DNA QUANTIFICATION In process     7/18/2017 0604 PRA DONOR SPECIFIC ANTIBODY In process             Admission Information     Date & Time Provider Department Dept. Phone    7/18/2017 John Dupont MD [facility] [phone]      Your Vitals Were     Blood Pressure Pulse Temperature Respirations Pulse Oximetry       145/71 65 97.2  F (36.2  C) (Temporal) 14 98%       MyChart Information     [employer] is an electronic gateway that provides easy, online access to your medical records. With [employer], you can request a clinic appointment, read your test results, renew a prescription or communicate with your care team.     To sign up for [employer] visit the website at [email or web address]   You will be asked to enter the access code listed below, as well as some personal information. Please follow the directions to create your username and password.     Your access code is: [email or web address]  Expires: 7/22/2017  1:44 PM     Your access code  will  in 90 days. If you need help or a new code, please contact your [facility] or call [phone] for assistance.        Care EveryWhere ID     This is your Care EveryWhere ID. This could be used by other organizations to access your [facility] medical records  [id number]        Equal Access to Services     TABITHA OLIVA : Hadvincent joseph hadasho Soomaali, waaxda luqadaha, qaybta kaalmada adejeannieyada, lesly villaltamashapanda angeles. So [facility] [place] [phone].    ATENCIÓN: Si habla español, tiene a dinh disposición servicios gratuitos de asistencia lingüística. Llame al [phone].    We comply with applicable federal civil rights laws and [place] laws. We do not discriminate on the basis of race, color, national origin, age, disability sex, sexual orientation or gender identity.               Review of your medicines      UNREVIEWED medicines. Ask your doctor about these medicines        Dose / Directions    alendronate 70 MG tablet   Commonly known as:  FOSAMAX   Used for:  Osteoporosis        Dose:  70 mg   Take 1 tablet (70 mg) by mouth every 7 days   Quantity:  12 tablet   Refills:  3       amLODIPine 5 MG tablet   Commonly known as:  NORVASC   Used for:  HTN (hypertension)        Dose:  5 mg   Take 1 tablet (5 mg) by mouth daily   Quantity:  90 tablet   Refills:  0       brimonidine-timolol 0.2-0.5 % ophthalmic solution   Commonly known as:  COMBIGAN        Dose:  1 drop   Place 1 drop into the right eye 2 times daily NOT ON HOLD   Refills:  0       brinzolamide 1 % ophthalmic susp   Commonly known as:  AZOPT        Dose:  1 drop   Place 1 drop into the right eye 2 times daily.   Refills:  0       calcitRIOL 0.25 MCG capsule   Commonly known as:  ROCALTROL        Dose:  0.25 mcg   Take 0.25 mcg by mouth   Refills:  0       calcium 500-125 MG-UNIT Tabs        Take  by mouth. Plus D take one daily   Refills:  0       carvedilol 6.25 MG tablet   Commonly known as:  COREG    Used for:  HTN (hypertension)        Dose:  37.5 mg   Take 6 tablets (37.5 mg) by mouth 2 times daily   Quantity:  60 tablet   Refills:  11       darbepoetin paulo 60 MCG/0.3ML injection   Commonly known as:  ARANESP        Dose:  60 mcg   Inject 60 mcg Subcutaneous   Refills:  0       furosemide 20 MG tablet   Commonly known as:  LASIX   Used for:  HTN (hypertension), Kidney replaced by transplant        Dose:  40 mg   Take 2 tablets (40 mg) by mouth 2 times daily   Quantity:  60 tablet   Refills:  1       glucagon 1 MG kit   Commonly known as:  GLUCAGON EMERGENCY   Used for:  Type 1 diabetes mellitus without complication (H)        Use in event of hypoglycemia   Quantity:  2 each   Refills:  6       IMURAN PO        Dose:  75 mg   Take 75 mg by mouth daily   Refills:  0       insulin glargine 100 UNIT/ML injection   Commonly known as:  LANTUS   Used for:  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   Quantity:  15 mL   Refills:  3       insulin lispro 100 UNIT/ML Cartridge   Commonly known as:  HUMALOG   Used for:  Type 1 diabetes mellitus with hypoglycemia and without coma (H)        Carb counting with meals approx 20 units daily   Quantity:  15 mL   Refills:  3       losartan 25 MG tablet   Commonly known as:  COZAAR        Dose:  25 mg   Take 25 mg by mouth   Refills:  0       MULTIVITAMIN PO        Dose:  1 tablet   Take 1 tablet by mouth daily.   Refills:  0       predniSONE 2.5 MG tablet   Commonly known as:  DELTASONE   Used for:  Kidney replaced by transplant, Kidney transplanted        Dose:  2.5 mg   Take 1 tablet (2.5 mg) by mouth daily   Quantity:  30 tablet   Refills:  11       sodium bicarbonate 650 MG tablet   Used for:  Metabolic acidosis        Dose:  1300 mg   Take 2 tablets (1,300 mg) by mouth 2 times daily   Quantity:  60 tablet   Refills:  11       tacrolimus 0.5 MG capsule   Commonly known as:  PROGRAF -  GENERIC EQUIVALENT   Used for:  Kidney transplanted, Kidney replaced by transplant        Dose:  0.5 mg   Take 1 capsule (0.5 mg) by mouth 2 times daily   Quantity:  60 capsule   Refills:  11         CONTINUE these medicines which have NOT CHANGED        Dose / Directions    blood glucose monitoring test strip   Commonly known as:  no brand specified   Used for:  Blindness of both eyes        Test 6 times per day with  Prodigy no  Coding  Glucose strips .   Quantity:  550 each   Refills:  3       Injection Device for insulin Helen   Commonly known as:  HUMAPEN LUXURA HD   Used for:  Type 1 diabetes mellitus with hypoglycemia and without coma (H)        Dose:  1 Device   1 each 5 times daily Use as directed with Humalog   Quantity:  1 each   Refills:  1       insulin pen needle 32G X 4 MM   Commonly known as:  BD MARQUISE U/F   Used for:  Diabetes mellitus type 1 (H)        Use 4-6 daily or as directed.   Quantity:  400 each   Refills:  3                Protect others around you: Learn how to safely use, store and throw away your medicines at www.disposemymeds.org.             Medication List: This is a list of all your medications and when to take them. Check marks below indicate your daily home schedule. Keep this list as a reference.      Medications           Morning Afternoon Evening Bedtime As Needed    alendronate 70 MG tablet   Commonly known as:  FOSAMAX   Take 1 tablet (70 mg) by mouth every 7 days                                amLODIPine 5 MG tablet   Commonly known as:  NORVASC   Take 1 tablet (5 mg) by mouth daily                                blood glucose monitoring test strip   Commonly known as:  no brand specified   Test 6 times per day with  Prodigy no  Coding  Glucose strips .                                brimonidine-timolol 0.2-0.5 % ophthalmic solution   Commonly known as:  COMBIGAN   Place 1 drop into the right eye 2 times daily NOT ON HOLD                                brinzolamide 1 %  ophthalmic susp   Commonly known as:  AZOPT   Place 1 drop into the right eye 2 times daily.                                calcitRIOL 0.25 MCG capsule   Commonly known as:  ROCALTROL   Take 0.25 mcg by mouth                                calcium 500-125 MG-UNIT Tabs   Take  by mouth. Plus D take one daily                                carvedilol 6.25 MG tablet   Commonly known as:  COREG   Take 6 tablets (37.5 mg) by mouth 2 times daily                                darbepoetin paulo 60 MCG/0.3ML injection   Commonly known as:  ARANESP   Inject 60 mcg Subcutaneous                                furosemide 20 MG tablet   Commonly known as:  LASIX   Take 2 tablets (40 mg) by mouth 2 times daily                                glucagon 1 MG kit   Commonly known as:  GLUCAGON EMERGENCY   Use in event of hypoglycemia                                IMURAN PO   Take 75 mg by mouth daily                                Injection Device for insulin Helen   Commonly known as:  HUMAPEN LUXURA HD   1 each 5 times daily Use as directed with Humalog                                insulin glargine 100 UNIT/ML injection   Commonly known as:  LANTUS   Inject 4 units in the AM; inject 6 units in the PM.                                insulin lispro 100 UNIT/ML Cartridge   Commonly known as:  HUMALOG   Carb counting with meals approx 20 units daily                                insulin pen needle 32G X 4 MM   Commonly known as:  BD MARQUISE U/F   Use 4-6 daily or as directed.                                losartan 25 MG tablet   Commonly known as:  COZAAR   Take 25 mg by mouth                                MULTIVITAMIN PO   Take 1 tablet by mouth daily.                                predniSONE 2.5 MG tablet   Commonly known as:  DELTASONE   Take 1 tablet (2.5 mg) by mouth daily                                sodium bicarbonate 650 MG tablet   Take 2 tablets (1,300 mg) by mouth 2 times daily                                tacrolimus  0.5 MG capsule   Commonly known as:  PROGRAF - GENERIC EQUIVALENT   Take 1 capsule (0.5 mg) by mouth 2 times daily

## 2017-07-18 NOTE — DISCHARGE INSTRUCTIONS
[facility]  Home Care Following Kidney Biopsy  ACTIVITY: NO heavy lifting (weight greater than 10 pounds) for 1 week. NO strenuous activity for 24 hours; relax and take it easy.     DIET: Resume your regular diet and drink plenty of fluids UNLESS you are fluid restricted.    DRAINAGE: There should be minimal drainage from the biopsy site. If bleeding soaks the dressing, you should lie down and apply pressure to the site for a minimum of 10 minutes. Call one of the numbers below if experienced bleeding that soaked the dressing.     DRESSING: Keep the dressing in place for 24 hours to prevent the site from re-opening and bleeding.     SEDATION: IF you received sedation medications, DO NOT drive or operate heavy machinery, DO NOT drink alcoholic beverages, and DO NOT make important legal decisions.    CALL ONE OF THE NUMBERS BELOW IF YOU EXPERIENCE ANY ONE OF THE FOLLOWING:      Excessive bleeding or drainage    Excessive swelling, redness, or tenderness at the site    Fever above 100.5 F, orally    Severe pain    Drainage that is green, yellow, thick white, or has a bad odor    If you havebloody urine or see bloody clots in your urine     Emergency Department: [phone] (open 24 hours)  Transplant Center: [phone] (open 7:00 AM - 6:30 PM)   [facility]  Home Care Following Kidney Biopsy  ACTIVITY: NO heavy lifting (weight greater than 10 pounds) for 1 week. NO strenuous activity for 24 hours; relax and take it easy.     DIET: Resume your regular diet and drink plenty of fluids UNLESS you are fluid restricted.    DRAINAGE: There should be minimal drainage from the biopsy site. If bleeding soaks the dressing, you should lie down and apply pressure to the site for a minimum of 10 minutes. Call one of the numbers below if experienced bleeding that soaked the dressing.     DRESSING: Keep the dressing in place for 24 hours to prevent the site  from re-opening and bleeding.     SEDATION: IF you received sedation medications, DO NOT drive or operate heavy machinery, DO NOT drink alcoholic beverages, and DO NOT make important legal decisions.    CALL ONE OF THE NUMBERS BELOW IF YOU EXPERIENCE ANY ONE OF THE FOLLOWING:      Excessive bleeding or drainage    Excessive swelling, redness, or tenderness at the site    Fever above 100.5 F, orally    Severe pain    Drainage that is green, yellow, thick white, or has a bad odor    If you havebloody urine or see bloody clots in your urine     Emergency Department: [phone] (open 24 hours)  Transplant Center: [phone] (open 7:00 AM - 6:30 PM)

## 2017-07-18 NOTE — IP AVS SNAPSHOT
"[facility]: [phone]                                              INTERAGENCY TRANSFER FORM - LAB / IMAGING / EKG / EMG RESULTS   2017                    Hospital Admission Date: 2017  FELICIA LUNA   : 1960  Sex: Male        Attending Provider: John Dupont MD     Allergies:  Bactrim, Benadryl [Altaryl], Diamox [Acetazolamide Sodium], Morphine Sulfate    Infection:  VRE-Contact Isolation   Service:  SURGERY    Ht:  1.702 m (5' 7\")   Wt:  67.2 kg (148 lb 1.6 oz)   Admission Wt:  --    BMI:  23.2 kg/m 2   BSA:  1.78 m 2            Patient PCP Information     Provider PCP Type    [facility]         Lab Results - 3 Days      Urine Culture Aerobic Bacterial [[id number]]  Resulted: 17 1009, Result status: Preliminary result    Ordering provider: John Dupont MD  17 0604 Resulting lab: [facility]    Specimen Information    Type Source Collected On   Urine  17 0807          Components       Value Reference Range Flag Lab   Specimen Description Unspecified Urine   1740   Special Requests Specimen received in preservative   75   Culture Micro Pending   75   Micro Report Status Pending   75            Surgical pathology exam [[id number]]  Resulted: 17 0934, Result status: In process    Ordering provider: John Dupont MD  17 0929 Resulting lab: [facility]    Specimen Information    Type Source Collected On     17 09            Albumin Random Urine Quantitative [[id number]] (Abnormal)  Resulted: 17 0919, Result status: Final result    Ordering provider: John Dupont MD  17 0604 Resulting lab: [facility]    Specimen Information    Type Source Collected On   Urine  17 0806          Components       Value Reference Range Flag Lab   Creatinine Urine 26 mg/dL  FrStHsLb   Albumin Urine mg/L 476 mg/L  FrStHsLb   Albumin Urine mg/g Cr 1859.38 0 - " 17 mg/g Cr H FrStHsLb            Protein  random urine [[id number]] (Abnormal)  Resulted: 07/18/17 0916, Result status: Final result    Ordering provider: John Dupont MD  07/18/17 0604 Resulting lab: [facility]    Specimen Information    Type Source Collected On   Urine  07/18/17 0806          Components       Value Reference Range Flag Lab   Protein Random Urine 0.66 g/L  FrStHsLb   Protein Total Urine g/gr Creatinine 2.59 0 - 0.2 g/g Cr H FrStHsLb            Routine UA with microscopic [[id number]] (Abnormal)  Resulted: 07/18/17 0836, Result status: Final result    Ordering provider: John Dupont MD  07/18/17 0604 Resulting lab: [facility]    Specimen Information    Type Source Collected On   Urine  07/18/17 0806          Components       Value Reference Range Flag Lab   Color Urine Straw   1740   Appearance Urine Clear   1740   Glucose Urine Negative NEG mg/dL  1740   Bilirubin Urine Negative NEG  1740   Ketones Urine Negative NEG mg/dL  1740   Specific Gravity Urine 1.004 1.003 - 1.035  1740   Blood Urine Negative NEG  1740   pH Urine 8.0 5.0 - 7.0 pH H 1740   Protein Albumin Urine 100 NEG mg/dL A 1740   Urobilinogen mg/dL 0.0 0.0 - 2.0 mg/dL  1740   Nitrite Urine Negative NEG  1740   Leukocyte Esterase Urine Negative NEG  1740   Source Unspecified Urine   1740   WBC Urine 0 0 - 2 /HPF  1740   RBC Urine 1 0 - 2 /HPF  1740   Mucous Urine Present NEG /LPF A 1740            Basic metabolic panel [[id number]] (Abnormal)  Resulted: 07/18/17 0744, Result status: Edited Result - FINAL    Ordering provider: John Dupont MD  07/18/17 0604 Resulting lab: [facility]    Specimen Information    Type Source Collected On   Blood  07/18/17 0627          Components       Value Reference Range Flag Lab   Sodium 132 133 - 144 mmol/L L 1740   Potassium 4.2 3.4 - 5.3 mmol/L  1740   Chloride 99 94 - 109 mmol/L   1740   Carbon Dioxide 25 20 - 32 mmol/L  1740   Anion Gap 8 3 - 14 mmol/L  1740   Comment:  CORRECTED ON 07/18 AT 0743: PREVIOUSLY REPORTED AS 9   Glucose 44 70 - 99 mg/dL LL 1740   Comment:         Documentation to follow   Critical Value called to and read back by  MARE CALDERON, 7/18/17 AT 0741, BY AL     Urea Nitrogen 63 7 - 30 mg/dL H 1740   Creatinine 2.59 0.66 - 1.25 mg/dL H 1740   GFR Estimate 26 >60 mL/min/1.7m2 L 1740   Comment:         CORRECTED ON 07/18 AT 0743: PREVIOUSLY REPORTED AS 26 Non  GFR   Calc     GFR Estimate If Black 31 >60 mL/min/1.7m2 L 1740   Comment:         CORRECTED ON 07/18 AT 0743: PREVIOUSLY REPORTED AS 31  GFR   Calc     Calcium 8.3 8.5 - 10.1 mg/dL L 1740            INR [[id number]] (Abnormal)  Resulted: 07/18/17 0710, Result status: Final result    Ordering provider: John Dupont MD  07/18/17 0604 Resulting lab: [facility]    Specimen Information    Type Source Collected On   Blood  07/18/17 0627          Components       Value Reference Range Flag Lab   INR 1.24 0.86 - 1.14 H 1740            CBC with platelets differential [[id number]] (Abnormal)  Resulted: 07/18/17 0640, Result status: Final result    Ordering provider: John Dupont MD  07/18/17 0604 Resulting lab: [facility]    Specimen Information    Type Source Collected On   Blood  07/18/17 0627          Components       Value Reference Range Flag Lab   WBC 3.7 4.0 - 11.0 10e9/L L 1740   RBC Count 3.67 4.4 - 5.9 10e12/L L 1740   Hemoglobin 11.0 13.3 - 17.7 g/dL L 1740   Hematocrit 33.3 40.0 - 53.0 % L 1740   MCV 91 78 - 100 fl  1740   MCH 30.0 26.5 - 33.0 pg  1740   MCHC 33.0 31.5 - 36.5 g/dL  1740   RDW 15.0 10.0 - 15.0 %  1740   Platelet Count 199 150 - 450 10e9/L  1740   Diff Method Automated Method   1740   % Neutrophils 63.6 %  1740   % Lymphocytes 19.8 %  1740   % Monocytes 12.3 %  1740    % Eosinophils 3.5 %  1740   % Basophils 0.5 %  1740   % Immature Granulocytes 0.3 %  1740   Nucleated RBCs 0 0 /100  1740   Absolute Neutrophil 2.4 1.6 - 8.3 10e9/L  1740   Absolute Lymphocytes 0.7 0.8 - 5.3 10e9/L L 1740   Absolute Monocytes 0.5 0.0 - 1.3 10e9/L  1740   Absolute Eosinophils 0.1 0.0 - 0.7 10e9/L  1740   Absolute Basophils 0.0 0.0 - 0.2 10e9/L  1740   Abs Immature Granulocytes 0.0 0 - 0.4 10e9/L  1740   Absolute Nucleated RBC 0.0   1740            Tacrolimus level [[id number]]  Resulted: 07/18/17 0630, Result status: In process    Ordering provider: John Dupont MD  07/18/17 0604 Resulting lab: [facility]    Specimen Information    Type Source Collected On   Blood  07/18/17 0629            BK virus PCR quantitative [[id number]]  Resulted: 07/18/17 0630, Result status: In process    Ordering provider: John Dupont MD  07/18/17 0604 Resulting lab: [facility]    Specimen Information    Type Source Collected On   Blood  07/18/17 0628            CMV DNA quantification [[id number]]  Resulted: 07/18/17 0629, Result status: In process    Ordering provider: John Dupont MD  07/18/17 0604 Resulting lab: [facility]    Specimen Information    Type Source Collected On   Blood  07/18/17 0628            PRA Donor Specific Antibody [[id number]]  Resulted: 07/18/17 0629, Result status: In process    Ordering provider: John Dupont MD  07/18/17 0604 Resulting lab: [facility]    Specimen Information    Type Source Collected On   Blood  07/18/17 0627            Testing Performed By     Lab - Abbreviation Name Director Address Valid Date Range    14 - FrStHsLb [facility] Unknown [address] [place] [address] 05/08/15 1057 - Present    45 - [id number] [facility] Unknown Unknown 01/28/02 0000 - Present    75 - Unknown [facility] Unknown [address] [facility] [address] 01/15/15 1019 - Present    88 - Unknown [facility] Unknown Unknown 10/30/02 0000 - Present    1740 - Unknown  [facility] Unknown [address] [address] 02/15/16 1113 - Present            Unresulted Labs (24h ago through future)    Start       Ordered    07/18/17 1345  Hemoglobin  TIMED - ONCE,   Timed     Comments:  Collect 4 hours post biopsy    07/18/17 0949         Imaging Results - 3 Days      US Biopsy Renal [[id number]]  Resulted: 07/18/17 0925, Result status: Final result    Ordering provider: John Dupont MD  07/18/17 0644 Performed: 07/18/17 0748 - 07/18/17 0925    Resulting lab: [facility]      Narrative:       This exam was marked as non-reportable because it will not be read by a   radiologist or a [place] non-radiologist provider.                Testing Performed By     Lab - Abbreviation Name Director Address Valid Date Range    178 - [facility] Unknown Unknown 07/20/12 1135 - Present            Encounter-Level Documents:     There are no encounter-level documents.      Order-Level Documents:     There are no order-level documents.

## 2017-07-18 NOTE — H&P
Nephrology Procedure H&P  07/18/2017     Assessment & Recommendations:   1. LDKT - baseline creatinine ~ 2 mg/dL , which has trended up. Mild proteinuria. Will plan on kidney transplant biopsy to evaluate for etiology of DM and TG.  Possible causes include, but not limited to, rejection vs chronic progression. Will make no changes in immunosuppression. May need to re challenge with MMF if diarrhea remains under control.     Transplant History:  Transplant: 2/15/1991 (Kidney), 9/12/1997 (Pancreas), 2/7/2012 (Pancreas), 10/28/1988 (Kidney), 10/28/1988 (Pancreas), 10/28/1988 (Kidney / Pancreas)        Donor Class:   Crossmatch at time of Transplant:    DSA at time of Transplant:  Yes  Present Maintenance Immunosuppression:  Tacrolimus, Azathioprine and Prednisone  Baseline creatinine:  2 mg/dL  Latest DSA lab date:  PRA:  Class I:   SA1 Comments   Date Value Ref Range Status   06/06/2017   Final     Test performed by modified procedure. Serum heat inactivated. High-risk,   mfi >3,000. Mod-risk, mfi 500-3,000.         Class II:    SA2 Comments   Date Value Ref Range Status   06/06/2017   Final     Test performed by modified procedure. Serum heat inactivated. High-risk,   mfi >3,000. Mod-risk, mfi 500-3,000.       Biopsy:  Yes: TG and DM and AH3  Rejection History:  Yes: chronic active  Significant Complications: None  Transplant Coordinator: Padmini Vences   Transplant Office Phone Number:  [phone]     2. Hypertension - well controlled at target of less than 140/90. No changes.  3. Diarrhea better controlled on antidiarrheal without much changes when switched to AZA from MMF     Reason for Visit:  Mr. Juan is here for elevated creatinine and potential kidney transplant biopsy.    History of Present Illness:  Lorne Juan is a 56 year old male with ESKD from Type 1 Diabetes and is status post LDKT .    No specific complaints feels well.     Pain Over Kidney Tx:  No Pain or Burning with Urination:   No  Gross Hematuria:  No  Taking NSAIDs:  No    Home BP: controlled    Review of Systems:  A comprehensive review of systems was obtained and negative, except as noted in the History of Present Illness or Active Medical Problems.    Active Medical Problems:  Patient Active Problem List    Diagnosis     Hypertension secondary to other renal disorders     Metabolic acidosis     Aftercare following organ transplant     Secondary renal hyperparathyroidism (H)     Diabetic gastroparesis (H)     Kidney replaced by transplant     Immunosuppressed status (H)     Anemia in chronic renal disease     Diabetes mellitus type 1 (H)     Vitamin D deficiency     Osteoporosis     Disorder of bone and cartilage     Thrombocytopenia (H)     Status post pancreas transplantation (H)     Current Medications:  Current Outpatient Prescriptions   Medication Sig Dispense Refill     amLODIPine (NORVASC) 5 MG tablet Take 1 tablet (5 mg) by mouth daily 90 tablet 0     AzaTHIOprine (IMURAN PO) Take 75 mg by mouth daily       losartan (COZAAR) 25 MG tablet Take 25 mg by mouth       darbepoetin paulo (ARANESP) 60 MCG/0.3ML injection Inject 60 mcg Subcutaneous       calcitRIOL (ROCALTROL) 0.25 MCG capsule Take 0.25 mcg by mouth       predniSONE (DELTASONE) 2.5 MG tablet Take 1 tablet (2.5 mg) by mouth daily 30 tablet 11     tacrolimus (PROGRAF - GENERIC EQUIVALENT) 0.5 MG capsule Take 1 capsule (0.5 mg) by mouth 2 times daily 60 capsule 11     carvedilol (COREG) 6.25 MG tablet Take 6 tablets (37.5 mg) by mouth 2 times daily 60 tablet 11     furosemide (LASIX) 20 MG tablet Take 2 tablets (40 mg) by mouth 2 times daily 60 tablet 1     sodium bicarbonate 650 MG tablet Take 2 tablets (1,300 mg) by mouth 2 times daily (Patient taking differently: Take 1,950 mg by mouth 2 times daily ) 60 tablet 11     blood glucose monitoring (NO BRAND SPECIFIED) test strip Test 6 times per day with  Prodigy no  Coding  Glucose strips . 550 each 3     insulin pen needle  (BD MARQUISE U/F) 32G X 4 MM Use 4-6 daily or as directed. 400 each 3     insulin lispro (HUMALOG) 100 UNIT/ML Cartridge Carb counting with meals approx 20 units daily 15 mL 3     insulin glargine (LANTUS) 100 UNIT/ML vial Inject 4 units in the AM; inject 6 units in the PM. 15 mL 3     Injection Device for insulin (HUMAPEN LUXURA HD) ROBINSON 1 each 5 times daily Use as directed with Humalog 1 each 1     calcium 500-125 MG-UNIT TABS Take  by mouth. Plus D take one daily       brimonidine-timolol (COMBIGAN) 0.2-0.5 % ophthalmic solution Place 1 drop into the right eye 2 times daily NOT ON HOLD       Multiple Vitamin (MULTIVITAMIN OR) Take 1 tablet by mouth daily.       brinzolamide (AZOPT) 1 % ophthalmic suspension Place 1 drop into the right eye 2 times daily.       glucagon (GLUCAGON EMERGENCY) 1 MG injection Use in event of hypoglycemia 2 each 6     alendronate (FOSAMAX) 70 MG tablet Take 1 tablet (70 mg) by mouth every 7 days (Patient not taking: Reported on 6/6/2017) 12 tablet 3     Vitals:  /74  Pulse 66  Temp 97.2  F (36.2  C) (Temporal)  Resp 14  SpO2 98%    Physical Exam:   GENERAL APPEARANCE: alert and no distress  HENT: mouth without ulcers or lesions  PULM: lungs clear to auscultation, equal air movement  CV: regular rhythm, normal rate     - no LE edema bilaterally  GI: soft, nontender, bowel sounds are normal  MS: no evidence of inflammation in joints, no muscle tenderness  TX KIDNEY: nontender    Labs:   All labs reviewed by me  Recent Results (from the past 8 hour(s))   Basic metabolic panel    Collection Time: 07/18/17  6:27 AM   Result Value Ref Range    Sodium 132 (L) 133 - 144 mmol/L    Potassium 4.2 3.4 - 5.3 mmol/L    Chloride 99 94 - 109 mmol/L    Carbon Dioxide 25 20 - 32 mmol/L    Anion Gap 8 3 - 14 mmol/L    Glucose 44 (LL) 70 - 99 mg/dL    Urea Nitrogen 63 (H) 7 - 30 mg/dL    Creatinine 2.59 (H) 0.66 - 1.25 mg/dL    GFR Estimate 26 (L) >60 mL/min/1.7m2    GFR Estimate If Black 31 (L) >60  mL/min/1.7m2    Calcium 8.3 (L) 8.5 - 10.1 mg/dL   CBC with platelets differential    Collection Time: 07/18/17  6:27 AM   Result Value Ref Range    WBC 3.7 (L) 4.0 - 11.0 10e9/L    RBC Count 3.67 (L) 4.4 - 5.9 10e12/L    Hemoglobin 11.0 (L) 13.3 - 17.7 g/dL    Hematocrit 33.3 (L) 40.0 - 53.0 %    MCV 91 78 - 100 fl    MCH 30.0 26.5 - 33.0 pg    MCHC 33.0 31.5 - 36.5 g/dL    RDW 15.0 10.0 - 15.0 %    Platelet Count 199 150 - 450 10e9/L    Diff Method Automated Method     % Neutrophils 63.6 %    % Lymphocytes 19.8 %    % Monocytes 12.3 %    % Eosinophils 3.5 %    % Basophils 0.5 %    % Immature Granulocytes 0.3 %    Nucleated RBCs 0 0 /100    Absolute Neutrophil 2.4 1.6 - 8.3 10e9/L    Absolute Lymphocytes 0.7 (L) 0.8 - 5.3 10e9/L    Absolute Monocytes 0.5 0.0 - 1.3 10e9/L    Absolute Eosinophils 0.1 0.0 - 0.7 10e9/L    Absolute Basophils 0.0 0.0 - 0.2 10e9/L    Abs Immature Granulocytes 0.0 0 - 0.4 10e9/L    Absolute Nucleated RBC 0.0    INR    Collection Time: 07/18/17  6:27 AM   Result Value Ref Range    INR 1.24 (H) 0.86 - 1.14   ABO/Rh type and screen    Collection Time: 07/18/17  6:28 AM   Result Value Ref Range    ABO O     RH(D)  Pos     Antibody Screen Neg     Test Valid Only At       [facility]    Specimen Expires 07/21/2017    Routine UA with microscopic    Collection Time: 07/18/17  8:06 AM   Result Value Ref Range    Color Urine Straw     Appearance Urine Clear     Glucose Urine Negative NEG mg/dL    Bilirubin Urine Negative NEG    Ketones Urine Negative NEG mg/dL    Specific Gravity Urine 1.004 1.003 - 1.035    Blood Urine Negative NEG    pH Urine 8.0 (H) 5.0 - 7.0 pH    Protein Albumin Urine 100 (A) NEG mg/dL    Urobilinogen mg/dL 0.0 0.0 - 2.0 mg/dL    Nitrite Urine Negative NEG    Leukocyte Esterase Urine Negative NEG    Source Unspecified Urine     WBC Urine 0 0 - 2 /HPF    RBC Urine 1 0 - 2 /HPF    Mucous Urine Present (A) NEG /LPF   Albumin Random  Urine Quantitative    Collection Time: 07/18/17  8:06 AM   Result Value Ref Range    Creatinine Urine 26 mg/dL    Albumin Urine mg/L 476 mg/L    Albumin Urine mg/g Cr 1859.38 (H) 0 - 17 mg/g Cr   Protein  random urine    Collection Time: 07/18/17  8:06 AM   Result Value Ref Range    Protein Random Urine 0.66 g/L    Protein Total Urine g/gr Creatinine 2.59 (H) 0 - 0.2 g/g Cr   Urine Culture Aerobic Bacterial    Collection Time: 07/18/17  8:07 AM   Result Value Ref Range    Specimen Description Unspecified Urine     Special Requests Specimen received in preservative     Culture Micro Pending     Micro Report Status Pending         John Dupont MD

## 2017-07-18 NOTE — IP AVS SNAPSHOT
[facility]    [address] [place] [id number]    Phone:  [phone]    Fax:  [phone]                                       After Visit Summary   7/18/2017    Lorne Juan    MRN: [id number]           After Visit Summary Signature Page     I have received my discharge instructions, and my questions have been answered. I have discussed any challenges I see with this plan with the nurse or doctor.    ..........................................................................................................................................  Patient/Patient Representative Signature      ..........................................................................................................................................  Patient Representative Print Name and Relationship to Patient    ..................................................               ................................................  Date                                            Time    ..........................................................................................................................................  Reviewed by Signature/Title    ...................................................              ..............................................  Date                                                            Time

## 2017-07-19 LAB
BACTERIA SPEC CULT: NO GROWTH
BKV DNA # SPEC NAA+PROBE: NORMAL COPIES/ML
BKV DNA SPEC NAA+PROBE-LOG#: NORMAL LOG COPIES/ML
COPATH REPORT: NORMAL
Lab: NORMAL
MICRO REPORT STATUS: NORMAL
PRA DONOR SPECIFIC ABY: NORMAL
SPECIMEN SOURCE: NORMAL
SPECIMEN SOURCE: NORMAL

## 2017-07-20 LAB
DONOR IDENTIFICATION: NORMAL
DSA COMMENTS: NORMAL
DSA PRESENT: NORMAL
DSA TEST METHOD: NORMAL
ORGAN: NORMAL
SA1 CELL: NORMAL
SA1 COMMENTS: NORMAL
SA1 HI RISK ABY: NORMAL
SA1 MOD RISK ABY: NORMAL
SA1 TEST METHOD: NORMAL
SA2 CELL: NORMAL
SA2 COMMENTS: NORMAL
SA2 HI RISK ABY UA: NORMAL
SA2 MOD RISK ABY: NORMAL
SA2 TEST METHOD: NORMAL

## 2017-07-21 ENCOUNTER — TELEPHONE (OUTPATIENT)
Dept: TRANSPLANT | Facility: CLINIC | Age: 57
End: 2017-07-21

## 2017-07-21 NOTE — TELEPHONE ENCOUNTER
Pt called asking for jerad bx results.   We discussed in general terms that his resutls are chronic changes and he does not have any acute rejection.

## 2017-07-24 ENCOUNTER — TELEPHONE (OUTPATIENT)
Dept: TRANSPLANT | Facility: CLINIC | Age: 57
End: 2017-07-24

## 2017-07-25 ENCOUNTER — TELEPHONE (OUTPATIENT)
Dept: NEPHROLOGY | Facility: CLINIC | Age: 57
End: 2017-07-25

## 2017-07-25 ENCOUNTER — TELEPHONE (OUTPATIENT)
Dept: TRANSPLANT | Facility: CLINIC | Age: 57
End: 2017-07-25

## 2017-07-25 NOTE — TELEPHONE ENCOUNTER
Patient called back. Said he couldn't remember what Dr. Dupont had said to him about changing BP med. Said he is currently taking 37.5 mg daily (he has 25mg tablets). Advised him to cut dose from 1.5 tablets to 1 tablet daily, per Kiah's recommendation. Patient verbalized understanding.    Wants to know if Dr. Dupont wanted to switch immunosuppression meds. Said Dr. Dupont considered switching from imuran to cellcept. Message was sent to Dr. Dupont and transplant coordinator.    Wendy Padilla RN

## 2017-07-25 NOTE — TELEPHONE ENCOUNTER
Called Mr. Juan. I discussed the biopsy results with him. I answered all his questions satisfactorily. Next problem was his low blood pressure his systolic blood pressure has been running around 80 and he was seeking advice so I advised him to lower his Coreg from 6.25 from 6 tablets twice a day to 1 tablet twice a day and to measure his blood pressure before taking the medicine. If his blood pressure continues to be less than 120 he was instructed to hold his amlodipine. We'll forward the disc facets take 80 hour nephrology nurse to follow-up on his blood pressure in the next 24-48 hours

## 2017-07-25 NOTE — TELEPHONE ENCOUNTER
Please call Lorne;  He spoke with Dr. Dupont  This afternoon 07/25/17 but does not remember what was said.  Lorne stated he had a hyperglycemia episode.  Is feeling ok now.  Has questions regarding medications.

## 2017-07-26 NOTE — TELEPHONE ENCOUNTER
Spoke with patient. Said BP at 7:30am was 160/85, was taken before medications. Has not rechecked since and isn't home. Yesterday at noon was 130/70. He is feeling okay. Low bp only happens a few times per week.    His local nephrologist. Dr. Yanez, is okay with him staying on 25mg. Update sent to Dr. Dupont.    Wendy Padilla RN

## 2017-07-28 ENCOUNTER — TELEPHONE (OUTPATIENT)
Dept: TRANSPLANT | Facility: CLINIC | Age: 57
End: 2017-07-28

## 2017-07-28 DIAGNOSIS — Z94.0 KIDNEY REPLACED BY TRANSPLANT: Primary | ICD-10-CM

## 2017-07-28 NOTE — TELEPHONE ENCOUNTER
John Dupont MD [place], Whitney, RN        Cc: Arnoldo Zaman MD                     He was recently switch to imuran for diarrhea which did not help.   At this point there is no active rejection so I am not sure if its going to add anything.   I will defer to Dr. Zaman       Will contact pt back when it has been discussed, will continue on Imuran until then

## 2017-07-28 NOTE — TELEPHONE ENCOUNTER
Pharmacy sent message looking for MPA script as patient wants to start back on that. Looking at notes, Dr Dupont considering changing from Imuran to MPA. Patient receiving supply from pharmacy tomorrow for Imuran and will continue on that until reviewed with Dr Zaman and coord. Advised patient we will discuss more in length next week, patient confirmed.

## 2017-08-02 NOTE — TELEPHONE ENCOUNTER
Discussed with transplant team. Per Dr Dupont ok to change back to MMF dose he was on previously, which per chart review shows he was switched from  mg BID to Imuran 75 mg QD on 12/20/2016.     Please call pt and let him know ok to go back to cellcept 500 mg BID. Please send to pharmacy.

## 2017-08-03 ENCOUNTER — TELEPHONE (OUTPATIENT)
Dept: TRANSPLANT | Facility: CLINIC | Age: 57
End: 2017-08-03

## 2017-08-03 RX ORDER — MYCOPHENOLATE MOFETIL 500 MG/1
500 TABLET, FILM COATED ORAL 2 TIMES DAILY
Qty: 120 TABLET | Refills: 5 | Status: SHIPPED | OUTPATIENT
Start: 2017-08-03 | End: 2018-08-27

## 2017-08-29 ENCOUNTER — TELEPHONE (OUTPATIENT)
Dept: TRANSPLANT | Facility: CLINIC | Age: 57
End: 2017-08-29

## 2017-08-29 NOTE — LETTER
OUTPATIENT LABORATORY TEST ORDER:  After First Year    Patient Name: Lorne Juan                           Transplant Date: 02-  YOB: 1960                                                Issue Date & Time:8/30/2017  9:22 AM    [facility] MR: [id number]                                                Exp. Date (1 year after date issued)    Diagnoses:   Kidney Transplant (ICD-10 Z94.0)      Long term use of medications (ICD-10  Z79.899)             Lab results to be available on the same day drawn.   Please release results to patient upon their request    Please fax to the Transplant Center at [phone].    Every 2 Months   Complete Blood Count with Platelets    Basic Metabolic Panel (Sodium, Potassium, Chloride, CO2, Creatinine, Urea Nitrogen, Glucose, Calcium)   /Tacrolimus/Prograf drug level      Every 6 months,     Due:             Complete Lipid Panel fasting (Cholesterol, Triglycerides, HDL, LDL)            Glycosylated Hemoglobin (A1c)              Urine for Protein/Creatinine        If you have any questions, please call [facility] at [phone] or [phone].      .

## 2017-11-21 ENCOUNTER — TELEPHONE (OUTPATIENT)
Dept: ENDOCRINOLOGY | Facility: CLINIC | Age: 57
End: 2017-11-21

## 2017-11-21 NOTE — TELEPHONE ENCOUNTER
Spoke w/ pt, stated he is seeing his PCP for diabetes/endocrine care. He will also call his pharmacy to ensure the Rx refill requests get forwarded to the PCP.

## 2018-02-07 DIAGNOSIS — Z94.0 KIDNEY REPLACED BY TRANSPLANT: Primary | ICD-10-CM

## 2018-02-07 DIAGNOSIS — Z79.899 ENCOUNTER FOR LONG-TERM CURRENT USE OF MEDICATION: ICD-10-CM

## 2018-02-07 DIAGNOSIS — Z48.298 AFTERCARE FOLLOWING ORGAN TRANSPLANT: ICD-10-CM

## 2018-02-12 ENCOUNTER — OFFICE VISIT (OUTPATIENT)
Dept: NEPHROLOGY | Facility: CLINIC | Age: 58
End: 2018-02-12
Attending: INTERNAL MEDICINE
Payer: MEDICARE

## 2018-02-12 VITALS
HEART RATE: 66 BPM | BODY MASS INDEX: 21.72 KG/M2 | WEIGHT: 138.4 LBS | HEIGHT: 67 IN | TEMPERATURE: 98.1 F | DIASTOLIC BLOOD PRESSURE: 81 MMHG | SYSTOLIC BLOOD PRESSURE: 179 MMHG | OXYGEN SATURATION: 99 %

## 2018-02-12 DIAGNOSIS — N25.81 SECONDARY RENAL HYPERPARATHYROIDISM (H): ICD-10-CM

## 2018-02-12 DIAGNOSIS — Z79.899 ENCOUNTER FOR LONG-TERM CURRENT USE OF MEDICATION: ICD-10-CM

## 2018-02-12 DIAGNOSIS — N18.4 ANEMIA IN STAGE 4 CHRONIC KIDNEY DISEASE (H): ICD-10-CM

## 2018-02-12 DIAGNOSIS — D63.1 ANEMIA IN STAGE 4 CHRONIC KIDNEY DISEASE (H): ICD-10-CM

## 2018-02-12 DIAGNOSIS — I15.1 HYPERTENSION SECONDARY TO OTHER RENAL DISORDERS: ICD-10-CM

## 2018-02-12 DIAGNOSIS — Z94.0 KIDNEY REPLACED BY TRANSPLANT: ICD-10-CM

## 2018-02-12 DIAGNOSIS — D84.9 IMMUNOSUPPRESSED STATUS (H): ICD-10-CM

## 2018-02-12 DIAGNOSIS — Z94.0 KIDNEY REPLACED BY TRANSPLANT: Primary | ICD-10-CM

## 2018-02-12 DIAGNOSIS — Z48.298 AFTERCARE FOLLOWING ORGAN TRANSPLANT: ICD-10-CM

## 2018-02-12 DIAGNOSIS — E87.20 METABOLIC ACIDOSIS: ICD-10-CM

## 2018-02-12 LAB
ANION GAP SERPL CALCULATED.3IONS-SCNC: 8 MMOL/L (ref 3–14)
BUN SERPL-MCNC: 38 MG/DL (ref 7–30)
CALCIUM SERPL-MCNC: 8.4 MG/DL (ref 8.5–10.1)
CHLORIDE SERPL-SCNC: 105 MMOL/L (ref 94–109)
CO2 SERPL-SCNC: 23 MMOL/L (ref 20–32)
CREAT SERPL-MCNC: 2.57 MG/DL (ref 0.66–1.25)
CREAT UR-MCNC: 80 MG/DL
ERYTHROCYTE [DISTWIDTH] IN BLOOD BY AUTOMATED COUNT: 13.1 % (ref 10–15)
GFR SERPL CREATININE-BSD FRML MDRD: 26 ML/MIN/1.7M2
GLUCOSE SERPL-MCNC: 204 MG/DL (ref 70–99)
HCT VFR BLD AUTO: 30.5 % (ref 40–53)
HGB BLD-MCNC: 9.7 G/DL (ref 13.3–17.7)
MCH RBC QN AUTO: 29.2 PG (ref 26.5–33)
MCHC RBC AUTO-ENTMCNC: 31.8 G/DL (ref 31.5–36.5)
MCV RBC AUTO: 92 FL (ref 78–100)
PLATELET # BLD AUTO: 196 10E9/L (ref 150–450)
POTASSIUM SERPL-SCNC: 4.7 MMOL/L (ref 3.4–5.3)
PROT UR-MCNC: 1.27 G/L
PROT/CREAT 24H UR: 1.58 G/G CR (ref 0–0.2)
RBC # BLD AUTO: 3.32 10E12/L (ref 4.4–5.9)
SODIUM SERPL-SCNC: 136 MMOL/L (ref 133–144)
WBC # BLD AUTO: 4.6 10E9/L (ref 4–11)

## 2018-02-12 PROCEDURE — 36415 COLL VENOUS BLD VENIPUNCTURE: CPT | Performed by: INTERNAL MEDICINE

## 2018-02-12 PROCEDURE — 84156 ASSAY OF PROTEIN URINE: CPT | Performed by: INTERNAL MEDICINE

## 2018-02-12 PROCEDURE — 85027 COMPLETE CBC AUTOMATED: CPT | Performed by: INTERNAL MEDICINE

## 2018-02-12 PROCEDURE — 80048 BASIC METABOLIC PNL TOTAL CA: CPT | Performed by: INTERNAL MEDICINE

## 2018-02-12 PROCEDURE — G0463 HOSPITAL OUTPT CLINIC VISIT: HCPCS

## 2018-02-12 ASSESSMENT — PAIN SCALES - GENERAL: PAINLEVEL: NO PAIN (0)

## 2018-02-12 NOTE — MR AVS SNAPSHOT
"              After Visit Summary   2/12/2018    Lorne Juan    MRN: [id number]           Patient Information     Date Of Birth          1960        Visit Information        Provider Department      2/12/2018 1:30 PM Arnoldo Zaman MD [facility] Nephrology        Today's Diagnoses     Kidney replaced by transplant    -  1    Aftercare following organ transplant        Anemia in stage 4 chronic kidney disease (H)        Immunosuppressed status (H)        Hypertension secondary to other renal disorders        Secondary renal hyperparathyroidism (H)        Metabolic acidosis           Follow-ups after your visit        Follow-up notes from your care team     Return in about 1 year (around 2/12/2019).      Future tests that were ordered for you today     Open Future Orders        Priority Expected Expires Ordered    Tacrolimus level Routine 2/12/2018 2/12/2019 2/12/2018            Who to contact     If you have questions or need follow up information about today's clinic visit or your schedule please contact [facility] NEPHROLOGY directly at [phone].  Normal or non-critical lab and imaging results will be communicated to you by [employer], letter or phone within 4 business days after the clinic has received the results. If you do not hear from us within 7 days, please contact the clinic through [employer] or phone. If you have a critical or abnormal lab result, we will notify you by phone as soon as possible.  Submit refill requests through [email or web address] or call your pharmacy and they will forward the refill request to us. Please allow 3 business days for your refill to be completed.          Additional Information About Your Visit        [email or web address] Information     [email or web address] lets you send messages to your doctor, view your test results, renew your prescriptions, schedule appointments and more. To sign up, go to [email or web address] . Click on \"Log in\" on the left side of the screen, which will take you to the " "Welcome page. Then click on \"Sign up Now\" on the right side of the page.     You will be asked to enter the access code listed below, as well as some personal information. Please follow the directions to create your username and password.     Your access code is: [email or web address]  Expires: 2018  2:24 PM     Your access code will  in 90 days. If you need help or a new code, please call your [facility] or [phone].        Care EveryWhere ID     This is your Care EveryWhere ID. This could be used by other organizations to access your [facility] medical records  [id number]        Your Vitals Were     Pulse Temperature Height Pulse Oximetry BMI (Body Mass Index)       66 98.1  F (36.7  C) (Oral) 1.702 m (5' 7\") 99% 21.68 kg/m2        Blood Pressure from Last 3 Encounters:   18 179/81   17 152/76   17 121/71    Weight from Last 3 Encounters:   18 62.8 kg (138 lb 6.4 oz)   17 67.2 kg (148 lb 1.6 oz)   17 71.9 kg (158 lb 9.6 oz)              Today, you had the following     No orders found for display         Today's Medication Changes          These changes are accurate as of 18 11:59 PM.  If you have any questions, ask your nurse or doctor.               These medicines have changed or have updated prescriptions.        Dose/Directions    sodium bicarbonate 650 MG tablet   This may have changed:  how much to take   Used for:  Metabolic acidosis        Dose:  1300 mg   Take 2 tablets (1,300 mg) by mouth 2 times daily   Quantity:  60 tablet   Refills:  11                Primary Care Provider Office Phone # Fax #    Bob HITESH Quinn [phone]       [facility] [address] [address]        Equal Access to Services     TABITHA OLIVA : Ethan Mata, rajat sheikh, lesly dee. [facility] [phone].    ATENCIÓN: Si habla español, tiene a dinh disposición servicios " noemy de asistencia lingüística. Isha good [phone].    We comply with applicable federal civil rights laws and [place] laws. We do not discriminate on the basis of race, color, national origin, age, disability, sex, sexual orientation, or gender identity.            Thank you!     Thank you for choosing [facility] NEPHROLOGY  for your care. Our goal is always to provide you with excellent care. Hearing back from our patients is one way we can continue to improve our services. Please take a few minutes to complete the written survey that you may receive in the mail after your visit with us. Thank you!             Your Updated Medication List - Protect others around you: Learn how to safely use, store and throw away your medicines at www.disposemymeds.org.          This list is accurate as of 2/12/18 11:59 PM.  Always use your most recent med list.                   Brand Name Dispense Instructions for use Diagnosis    alendronate 70 MG tablet    FOSAMAX    12 tablet    Take 1 tablet (70 mg) by mouth every 7 days    Osteoporosis       amLODIPine 5 MG tablet    NORVASC    90 tablet    Take 1 tablet (5 mg) by mouth daily    HTN (hypertension)       blood glucose monitoring test strip    no brand specified    550 each    Test 6 times per day with  Prodigy no  Coding  Glucose strips .    Blindness of both eyes       brimonidine-timolol 0.2-0.5 % ophthalmic solution    COMBIGAN     Place 1 drop into the right eye 2 times daily NOT ON HOLD        brinzolamide 1 % ophthalmic susp    AZOPT     Place 1 drop into the right eye 2 times daily.        calcitRIOL 0.25 MCG capsule    ROCALTROL     Take 0.25 mcg by mouth        calcium 500-125 MG-UNIT Tabs      Take  by mouth. Plus D take one daily        carvedilol 6.25 MG tablet    COREG    60 tablet    Take 6 tablets (37.5 mg) by mouth 2 times daily    HTN (hypertension)       darbepoetin paulo 60 MCG/0.3ML injection    ARANESP     Inject 60 mcg Subcutaneous         furosemide 20 MG tablet    LASIX    60 tablet    Take 2 tablets (40 mg) by mouth 2 times daily    HTN (hypertension), Kidney replaced by transplant       glucagon 1 MG kit    GLUCAGON EMERGENCY    2 each    Use in event of hypoglycemia    Type 1 diabetes mellitus without complication (H)       Injection Device for insulin Helen    HUMAPEN LUXURA HD    1 each    1 each 5 times daily Use as directed with Humalog    Type 1 diabetes mellitus with hypoglycemia and without coma (H)       insulin glargine 100 UNIT/ML injection    LANTUS    15 mL    Inject 4 units in the AM; inject 6 units in the PM.    Type 1 diabetes mellitus with other diabetic ophthalmic complication       insulin lispro 100 UNIT/ML Cartridge    HUMALOG    15 mL    Carb counting with meals approx 20 units daily    Type 1 diabetes mellitus with hypoglycemia and without coma (H)       insulin pen needle 32G X 4 MM    BD MARQUISE U/F    400 each    Use 4-6 daily or as directed.    Diabetes mellitus type 1 (H)       losartan 25 MG tablet    COZAAR     Take 25 mg by mouth        MULTIVITAMIN PO      Take 1 tablet by mouth daily.        mycophenolate 500 MG tablet     120 tablet    Take 1 tablet (500 mg) by mouth 2 times daily    Kidney replaced by transplant       predniSONE 2.5 MG tablet    DELTASONE    30 tablet    Take 1 tablet (2.5 mg) by mouth daily    Kidney replaced by transplant, Kidney transplanted       sodium bicarbonate 650 MG tablet     60 tablet    Take 2 tablets (1,300 mg) by mouth 2 times daily    Metabolic acidosis       tacrolimus 0.5 MG capsule    GENERIC EQUIVALENT    60 capsule    Take 1 capsule (0.5 mg) by mouth 2 times daily    Kidney transplanted, Kidney replaced by transplant

## 2018-02-12 NOTE — LETTER
2/12/2018      RE: Lorne CARYN Yessica  [address]  [facility] [address]       Assessment and Plan:  1. LDKT - baseline Cr ~ 2.4-3.0, which has been stable recently.  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  There was also severe interstitial fibrosis and tubular atrophy, as well as severe vascular changes.  Moderate proteinuria.  He does have some DSA.  Minimal uremic symptoms.  Will make no changes in immunosuppression.  Patient hasn't had a qualifying GFR of 20 ml/min or less yet, but as soon as he does, would recommend kidney transplant reevaluation to get him waiting time.  2. HTN - okay control at target of less than 140/90.  No changes.  Patient to follow up with PCP and/or primary Nephrologist.  3. DM, s/p failed pancreas transplant - fair control.  Patient would not be a pancreas transplant candidate again.  4. Anemia in chronic kidney disease - stable Hgb on MANDY.  Iron replete.  Will defer to primary Nephrologist to manage.  5. Secondary renal hyperparathyroidism - moderate elevation in PTH and will continue on calcitriol.  Will defer to primary Nephrologist to manage.  6. Vitamin D deficiency - low normal vitamin D level and continue ergocalciferol.  Will defer to primary Nephrologist to manage.  7. Hypocalcemia - normal serum calcium level and will continue oral calcium supplement.  8. Metabolic acidosis - normal serum bicarbonate level and will continue on sodium bicarbonate supplement.  Will defer to primary Nephrologist to manage.  9. Diabetic gastroparesis - minimal symptoms at this time.  Patient to follow up with GI as needed.  10. Chronic diarrhea - stable symptoms as patient was changed back to mycophenolate mofetil without any change.  11. Weight loss - mostly stable weight up until recent illness.  Will follow.  12. Skin cancer risk - no new skin lesions.  Recommend  regular follow up with Dermatology.  13. Medication review - with patient's low GFR, would recommend stopping alendronate.  Will defer to primary Nephrologist or PCP to manage.  14. Recommend return visit in 12 months.    Assessment and plan was discussed with patient and he voiced his understanding and agreement.    Reason for Visit:  Mr. Juan is here for routine follow up.    HPI:   Lorne Juan is a 57 year old male with ESKD from DM and is status post LDKT on 2/15/91.         Transplant Hx:       Tx: LDKT  Date: 2/15/91       Present Maintenance IS: Tacrolimus, Mycophenolate mofetil and Prednisone       Baseline Creatinine: 2.4-3.0       Recent DSA: Yes  Date last checked: 6/2017       Biopsy: 7/18/17; no evidence of acute rejection.  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  There is severe interstitial fibrosis and tubular atrophy, as well as severe vascular changes.         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    Mr. Juan reports feeling okay overall with some medical complaints.  Since patient's last clinic visit, due to his worsening kidney function, patient underwent kidney transplant biopsy 7/18/17.  The biopsy showed no evidence of acute rejection.  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  There was also severe interstitial fibrosis and tubular atrophy, as well as severe vascular changes.  Due to continued diarrhea and patient's concerns about his kidney, he was changed back from azathioprine to mycophenolate mofetil.  There was also discussion about another kidney transplant, but he has not met GFR criteria for wait listing.    Patient reports battling an upper respiratory illness for the last 6 weeks or so.  He was seen  by his PCP for this, but no treatment was necessary and his symptoms are mostly improved.  However, his energy level remains low and worsened with this recent illness.  He does report his energy level was pretty good prior to the illness, although not normal.  He is active and does get some exercise.  Denies any chest pain or shortness of breath with exertion.  Appetite also decreased with this recent illness, but had been good and improved again over the last week or so.  His weight is down about 10 lbs.  No nausea or vomiting.  Stable diarrhea symptoms.  Patient did have some improvement initially in his diarrhea after change from mycophenolate mofetil to azathioprine, but then diarrhea returned so he was changed back to mycophenolate mofetil with no further change in symptoms.  No fever, sweats or chills.  Some night sweats, but this has been ongoing for years and unchanged recently.  Leg swelling is better.    Home BP: 130-60s.      ROS:   A comprehensive review of systems was obtained and negative, except as noted in the HPI or PMH.    Active Medical Problems:  Patient Active Problem List   Diagnosis     Status post pancreas transplantation (H)     Thrombocytopenia (H)     Vitamin D deficiency     Osteoporosis     Disorder of bone and cartilage     Diabetes mellitus type 1 (H)     Kidney replaced by transplant     Immunosuppressed status (H)     Anemia in chronic kidney disease     Metabolic acidosis     Aftercare following organ transplant     Secondary renal hyperparathyroidism (H)     Diabetic gastroparesis (H)     Hypertension secondary to other renal disorders       Personal Hx:  Social History     Social History     Marital status: Single     Spouse name: N/A     Number of children: N/A     Years of education: N/A     Occupational History     Not on file.     Social History Main Topics     Smoking status: Never Smoker     Smokeless tobacco: Never Used     Alcohol use Yes      Comment: Rare     Drug use:  No     Sexual activity: Not on file     Other Topics Concern     Not on file     Social History Narrative       Allergies:  Allergies   Allergen Reactions     Bactrim Diarrhea     Benadryl [Altaryl]      hyperactivity     Diamox [Acetazolamide Sodium] Other (See Comments)     Light headedness     Morphine Sulfate Itching       Medications:  Prior to Admission medications    Medication Sig Start Date End Date Taking? Authorizing Provider   azaTHIOprine 75 MG TABS Take 75 mg by mouth daily 12/20/16  Yes Arnoldo Zaman MD   sodium bicarbonate 650 MG tablet Take 1 tablet (650 mg) by mouth 2 times daily 12/20/16  Yes Arnoldo Zaman MD   LANTUS SOLOSTAR 100 UNIT/ML soln Inject 4 units in the AM; inject 6 units in the PM 11/22/16  Yes Taylor Bello PA-C   losartan (COZAAR) 25 MG tablet Take 2 tablets (50 mg) by mouth daily 11/21/16  Yes Arnoldo Zaman MD   blood glucose monitoring (NO BRAND SPECIFIED) test strip Test 6 times per day with  Prodigy no  Coding  Glucose strips . 11/11/16  Yes Taylor Bello PA-C   insulin pen needle (BD MARQUISE U/F) 32G X 4 MM Use 4-6 daily or as directed. 11/11/16  Yes Taylor Bello PA-C   furosemide (LASIX) 20 MG tablet Take 1 tablet (20 mg) by mouth 2 times daily 8/16/16  Yes Arnoldo Zaman MD   carvedilol (COREG) 6.25 MG tablet Take 1 tablet (6.25 mg) by mouth 2 times daily 8/15/16  Yes Arnoldo Zaman MD   tacrolimus (PROGRAF - GENERIC EQUIVALENT) 0.5 MG capsule Take 1 capsule (0.5 mg) by mouth 2 times daily 6/22/16  Yes Daija Mccann MD   insulin lispro (HUMALOG) 100 UNIT/ML Cartridge Carb counting with meals approx 20 units daily 4/19/16  Yes Taylor Bello PA-C   glucagon (GLUCAGON EMERGENCY) 1 MG injection Use in event of hypoglycemia 3/21/16  Yes Alis Slaughter MD   predniSONE (DELTASONE) 2.5 MG tablet Take 1 tablet (2.5 mg) by mouth daily 3/14/16  Yes Daija Mccann MD   alendronate (FOSAMAX) 70 MG tablet Take 1  "tablet (70 mg) by mouth every 7 days 11/3/15  Yes Alis Slaughter MD   insulin glargine (LANTUS) 100 UNIT/ML vial Inject 4 units in the AM; inject 6 units in the PM. 8/25/15  Yes Precious Bahena PA-C   Injection Device for insulin (HUMAPEN LUXURA HD) ROBINSON 1 each 5 times daily Use as directed with Humalog 5/22/15  Yes Taylor Bello PA-C   aspirin  MG tablet Take 1 tablet (325 mg) by mouth daily 10/23/13  Yes Isauro Mehta MD   calcium 500-125 MG-UNIT TABS Take  by mouth. Plus D take one daily   Yes Reported, Patient   brimonidine-timolol (COMBIGAN) 0.2-0.5 % ophthalmic solution Place 1 drop into the right eye 2 times daily NOT ON HOLD   Yes Unknown, Entered By History   Multiple Vitamin (MULTIVITAMIN OR) Take 1 tablet by mouth daily. 4/25/11  Yes Reported, Patient   brinzolamide (AZOPT) 1 % ophthalmic suspension Place 1 drop into the right eye 2 times daily. 4/25/11  Yes Reported, Patient       Vitals:  /81 (BP Location: Right arm, Patient Position: Sitting, Cuff Size: Adult Large)  Pulse 66  Temp 98.1  F (36.7  C) (Oral)  Ht 1.702 m (5' 7\")  Wt 62.8 kg (138 lb 6.4 oz)  SpO2 99%  BMI 21.68 kg/m2    Exam:   GENERAL APPEARANCE: alert and no distress  HENT: mouth without ulcers or lesions  LYMPHATICS: no cervical or supraclavicular nodes  RESP: lungs clear to auscultation - no rales, rhonchi or wheezes  CV: regular rhythm, normal rate, no rub, 2/6 systolic murmur  EDEMA: trace LE edema bilaterally  ABDOMEN: soft, nondistended, nontender, bowel sounds normal  MS: extremities normal - no gross deformities noted, no evidence of inflammation in joints, no muscle tenderness  SKIN: no rash  TX KIDNEY: normal    Results:   Recent Results (from the past 504 hour(s))   CBC with platelets    Collection Time: 02/12/18 12:42 PM   Result Value Ref Range    WBC 4.6 4.0 - 11.0 10e9/L    RBC Count 3.32 (L) 4.4 - 5.9 10e12/L    Hemoglobin 9.7 (L) 13.3 - 17.7 g/dL    Hematocrit 30.5 (L) 40.0 - " 53.0 %    MCV 92 78 - 100 fl    MCH 29.2 26.5 - 33.0 pg    MCHC 31.8 31.5 - 36.5 g/dL    RDW 13.1 10.0 - 15.0 %    Platelet Count 196 150 - 450 10e9/L   Basic metabolic panel    Collection Time: 02/12/18 12:42 PM   Result Value Ref Range    Sodium 136 133 - 144 mmol/L    Potassium 4.7 3.4 - 5.3 mmol/L    Chloride 105 94 - 109 mmol/L    Carbon Dioxide 23 20 - 32 mmol/L    Anion Gap 8 3 - 14 mmol/L    Glucose 204 (H) 70 - 99 mg/dL    Urea Nitrogen 38 (H) 7 - 30 mg/dL    Creatinine 2.57 (H) 0.66 - 1.25 mg/dL    GFR Estimate 26 (L) >60 mL/min/1.7m2    GFR Estimate If Black 31 (L) >60 mL/min/1.7m2    Calcium 8.4 (L) 8.5 - 10.1 mg/dL   Protein  random urine with Creat Ratio    Collection Time: 02/12/18 12:44 PM   Result Value Ref Range    Protein Random Urine 1.27 g/L    Protein Total Urine g/gr Creatinine 1.58 (H) 0 - 0.2 g/g Cr   Creatinine urine calculation only    Collection Time: 02/12/18 12:44 PM   Result Value Ref Range    Creatinine Urine 80 mg/dL       Arnoldo Zaman MD

## 2018-02-12 NOTE — NURSING NOTE
"Chief Complaint   Patient presents with     RECHECK     kidney TX       Initial /84 (BP Location: Right arm, Patient Position: Sitting, Cuff Size: Adult Large)  Pulse 66  Temp 98.1  F (36.7  C) (Oral)  Ht 1.702 m (5' 7\")  Wt 62.8 kg (138 lb 6.4 oz)  SpO2 99%  BMI 21.68 kg/m2 Estimated body mass index is 21.68 kg/(m^2) as calculated from the following:    Height as of this encounter: 1.702 m (5' 7\").    Weight as of this encounter: 62.8 kg (138 lb 6.4 oz).  Medication Reconciliation: complete     Efrem Huerta MA    "

## 2018-02-13 NOTE — PROGRESS NOTES
Assessment and Plan:  1. LDKT - baseline Cr ~ 2.4-3.0, which has been stable recently.  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  There was also severe interstitial fibrosis and tubular atrophy, as well as severe vascular changes.  Moderate proteinuria.  He does have some DSA.  Minimal uremic symptoms.  Will make no changes in immunosuppression.  Patient hasn't had a qualifying GFR of 20 ml/min or less yet, but as soon as he does, would recommend kidney transplant reevaluation to get him waiting time.  2. HTN - okay control at target of less than 140/90.  No changes.  Patient to follow up with PCP and/or primary Nephrologist.  3. DM, s/p failed pancreas transplant - fair control.  Patient would not be a pancreas transplant candidate again.  4. Anemia in chronic kidney disease - stable Hgb on MANDY.  Iron replete.  Will defer to primary Nephrologist to manage.  5. Secondary renal hyperparathyroidism - moderate elevation in PTH and will continue on calcitriol.  Will defer to primary Nephrologist to manage.  6. Vitamin D deficiency - low normal vitamin D level and continue ergocalciferol.  Will defer to primary Nephrologist to manage.  7. Hypocalcemia - normal serum calcium level and will continue oral calcium supplement.  8. Metabolic acidosis - normal serum bicarbonate level and will continue on sodium bicarbonate supplement.  Will defer to primary Nephrologist to manage.  9. Diabetic gastroparesis - minimal symptoms at this time.  Patient to follow up with GI as needed.  10. Chronic diarrhea - stable symptoms as patient was changed back to mycophenolate mofetil without any change.  11. Weight loss - mostly stable weight up until recent illness.  Will follow.  12. Skin cancer risk - no new skin lesions.  Recommend regular follow up with Dermatology.  13. Medication review - with patient's low  GFR, would recommend stopping alendronate.  Will defer to primary Nephrologist or PCP to manage.  14. Recommend return visit in 12 months.    Assessment and plan was discussed with patient and he voiced his understanding and agreement.    Reason for Visit:  Mr. Juan is here for routine follow up.    HPI:   Lorne Juan is a 57 year old male with ESKD from DM and is status post LDKT on 2/15/91.         Transplant Hx:       Tx: LDKT  Date: 2/15/91       Present Maintenance IS: Tacrolimus, Mycophenolate mofetil and Prednisone       Baseline Creatinine: 2.4-3.0       Recent DSA: Yes  Date last checked: 6/2017       Biopsy: 7/18/17; no evidence of acute rejection.  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  There is severe interstitial fibrosis and tubular atrophy, as well as severe vascular changes.         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    Mr. Juan reports feeling okay overall with some medical complaints.  Since patient's last clinic visit, due to his worsening kidney function, patient underwent kidney transplant biopsy 7/18/17.  The biopsy showed no evidence of acute rejection.  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  There was also severe interstitial fibrosis and tubular atrophy, as well as severe vascular changes.  Due to continued diarrhea and patient's concerns about his kidney, he was changed back from azathioprine to mycophenolate mofetil.  There was also discussion about another kidney transplant, but he has not met GFR criteria for wait listing.    Patient reports battling an upper respiratory illness for the last 6 weeks or so.  He was seen by his PCP for this, but no treatment was necessary and his symptoms are mostly  improved.  However, his energy level remains low and worsened with this recent illness.  He does report his energy level was pretty good prior to the illness, although not normal.  He is active and does get some exercise.  Denies any chest pain or shortness of breath with exertion.  Appetite also decreased with this recent illness, but had been good and improved again over the last week or so.  His weight is down about 10 lbs.  No nausea or vomiting.  Stable diarrhea symptoms.  Patient did have some improvement initially in his diarrhea after change from mycophenolate mofetil to azathioprine, but then diarrhea returned so he was changed back to mycophenolate mofetil with no further change in symptoms.  No fever, sweats or chills.  Some night sweats, but this has been ongoing for years and unchanged recently.  Leg swelling is better.    Home BP: 130-60s.      ROS:   A comprehensive review of systems was obtained and negative, except as noted in the HPI or PMH.    Active Medical Problems:  Patient Active Problem List   Diagnosis     Status post pancreas transplantation (H)     Thrombocytopenia (H)     Vitamin D deficiency     Osteoporosis     Disorder of bone and cartilage     Diabetes mellitus type 1 (H)     Kidney replaced by transplant     Immunosuppressed status (H)     Anemia in chronic kidney disease     Metabolic acidosis     Aftercare following organ transplant     Secondary renal hyperparathyroidism (H)     Diabetic gastroparesis (H)     Hypertension secondary to other renal disorders       Personal Hx:  Social History     Social History     Marital status: Single     Spouse name: N/A     Number of children: N/A     Years of education: N/A     Occupational History     Not on file.     Social History Main Topics     Smoking status: Never Smoker     Smokeless tobacco: Never Used     Alcohol use Yes      Comment: Rare     Drug use: No     Sexual activity: Not on file     Other Topics Concern     Not on file      Social History Narrative       Allergies:  Allergies   Allergen Reactions     Bactrim Diarrhea     Benadryl [Altaryl]      hyperactivity     Diamox [Acetazolamide Sodium] Other (See Comments)     Light headedness     Morphine Sulfate Itching       Medications:  Prior to Admission medications    Medication Sig Start Date End Date Taking? Authorizing Provider   azaTHIOprine 75 MG TABS Take 75 mg by mouth daily 12/20/16  Yes Arnoldo Zaman MD   sodium bicarbonate 650 MG tablet Take 1 tablet (650 mg) by mouth 2 times daily 12/20/16  Yes Arnoldo Zaman MD   LANTUS SOLOSTAR 100 UNIT/ML soln Inject 4 units in the AM; inject 6 units in the PM 11/22/16  Yes Taylor Bello PA-C   losartan (COZAAR) 25 MG tablet Take 2 tablets (50 mg) by mouth daily 11/21/16  Yes Arnoldo Zaman MD   blood glucose monitoring (NO BRAND SPECIFIED) test strip Test 6 times per day with  Prodigy no  Coding  Glucose strips . 11/11/16  Yes Taylor Bello PA-C   insulin pen needle (BD MARQUISE U/F) 32G X 4 MM Use 4-6 daily or as directed. 11/11/16  Yes Taylor Bello PA-C   furosemide (LASIX) 20 MG tablet Take 1 tablet (20 mg) by mouth 2 times daily 8/16/16  Yes Arnoldo Zaman MD   carvedilol (COREG) 6.25 MG tablet Take 1 tablet (6.25 mg) by mouth 2 times daily 8/15/16  Yes Arnoldo Zaman MD   tacrolimus (PROGRAF - GENERIC EQUIVALENT) 0.5 MG capsule Take 1 capsule (0.5 mg) by mouth 2 times daily 6/22/16  Yes Daija Mccann MD   insulin lispro (HUMALOG) 100 UNIT/ML Cartridge Carb counting with meals approx 20 units daily 4/19/16  Yes Taylor Bello PA-C   glucagon (GLUCAGON EMERGENCY) 1 MG injection Use in event of hypoglycemia 3/21/16  Yes Alis Slaughter MD   predniSONE (DELTASONE) 2.5 MG tablet Take 1 tablet (2.5 mg) by mouth daily 3/14/16  Yes Daija Mccann MD   alendronate (FOSAMAX) 70 MG tablet Take 1 tablet (70 mg) by mouth every 7 days 11/3/15  Yes Alis Slaughter MD  "  insulin glargine (LANTUS) 100 UNIT/ML vial Inject 4 units in the AM; inject 6 units in the PM. 8/25/15  Yes Precious Bahena PA-C   Injection Device for insulin (HUMAPEN LUXURA HD) ROBINSON 1 each 5 times daily Use as directed with Humalog 5/22/15  Yes Taylor Bello PA-C   aspirin  MG tablet Take 1 tablet (325 mg) by mouth daily 10/23/13  Yes Isauro Mehta MD   calcium 500-125 MG-UNIT TABS Take  by mouth. Plus D take one daily   Yes Reported, Patient   brimonidine-timolol (COMBIGAN) 0.2-0.5 % ophthalmic solution Place 1 drop into the right eye 2 times daily NOT ON HOLD   Yes Unknown, Entered By History   Multiple Vitamin (MULTIVITAMIN OR) Take 1 tablet by mouth daily. 4/25/11  Yes Reported, Patient   brinzolamide (AZOPT) 1 % ophthalmic suspension Place 1 drop into the right eye 2 times daily. 4/25/11  Yes Reported, Patient       Vitals:  /81 (BP Location: Right arm, Patient Position: Sitting, Cuff Size: Adult Large)  Pulse 66  Temp 98.1  F (36.7  C) (Oral)  Ht 1.702 m (5' 7\")  Wt 62.8 kg (138 lb 6.4 oz)  SpO2 99%  BMI 21.68 kg/m2    Exam:   GENERAL APPEARANCE: alert and no distress  HENT: mouth without ulcers or lesions  LYMPHATICS: no cervical or supraclavicular nodes  RESP: lungs clear to auscultation - no rales, rhonchi or wheezes  CV: regular rhythm, normal rate, no rub, 2/6 systolic murmur  EDEMA: trace LE edema bilaterally  ABDOMEN: soft, nondistended, nontender, bowel sounds normal  MS: extremities normal - no gross deformities noted, no evidence of inflammation in joints, no muscle tenderness  SKIN: no rash  TX KIDNEY: normal    Results:   Recent Results (from the past 504 hour(s))   CBC with platelets    Collection Time: 02/12/18 12:42 PM   Result Value Ref Range    WBC 4.6 4.0 - 11.0 10e9/L    RBC Count 3.32 (L) 4.4 - 5.9 10e12/L    Hemoglobin 9.7 (L) 13.3 - 17.7 g/dL    Hematocrit 30.5 (L) 40.0 - 53.0 %    MCV 92 78 - 100 fl    MCH 29.2 26.5 - 33.0 pg    MCHC 31.8 31.5 - " 36.5 g/dL    RDW 13.1 10.0 - 15.0 %    Platelet Count 196 150 - 450 10e9/L   Basic metabolic panel    Collection Time: 02/12/18 12:42 PM   Result Value Ref Range    Sodium 136 133 - 144 mmol/L    Potassium 4.7 3.4 - 5.3 mmol/L    Chloride 105 94 - 109 mmol/L    Carbon Dioxide 23 20 - 32 mmol/L    Anion Gap 8 3 - 14 mmol/L    Glucose 204 (H) 70 - 99 mg/dL    Urea Nitrogen 38 (H) 7 - 30 mg/dL    Creatinine 2.57 (H) 0.66 - 1.25 mg/dL    GFR Estimate 26 (L) >60 mL/min/1.7m2    GFR Estimate If Black 31 (L) >60 mL/min/1.7m2    Calcium 8.4 (L) 8.5 - 10.1 mg/dL   Protein  random urine with Creat Ratio    Collection Time: 02/12/18 12:44 PM   Result Value Ref Range    Protein Random Urine 1.27 g/L    Protein Total Urine g/gr Creatinine 1.58 (H) 0 - 0.2 g/g Cr   Creatinine urine calculation only    Collection Time: 02/12/18 12:44 PM   Result Value Ref Range    Creatinine Urine 80 mg/dL

## 2018-02-14 ENCOUNTER — CARE COORDINATION (OUTPATIENT)
Dept: NEPHROLOGY | Facility: CLINIC | Age: 58
End: 2018-02-14

## 2018-02-14 NOTE — PROGRESS NOTES
Received voicemail from patient yesterday, requested to have lab results faxed to [facility], coordinator's name / contact info, and hemoglobin result. Called patient to discuss, denied further questions. Results faxed.    Wendy Padilla RN

## 2018-03-16 ENCOUNTER — TELEPHONE (OUTPATIENT)
Dept: TRANSPLANT | Facility: CLINIC | Age: 58
End: 2018-03-16

## 2018-03-16 NOTE — TELEPHONE ENCOUNTER
Call placed to patient: No answer. Detailed voice message left with information listed below. Will try back

## 2018-03-16 NOTE — TELEPHONE ENCOUNTER
Tacrolimus 2.5, goal 3-5 per protocol  Previous level was at goal  Please call pt to verify this was a good trough level. Confirm dose 0.5mg BID. Repeat level in 1-2 weeks. Will make dose changes at that time if necessary.

## 2018-03-16 NOTE — LETTER
PHYSICIAN ORDERS      DATE & TIME ISSUED: 2018 12:27 PM  PATIENT NAME: Lorne Juan   : 1960     [facility] MR# [if applicable]: [id number]     DIAGNOSIS:  Kidney transplant  ICD-9 CODE: Z94.0      Please recheck tacrolimus level in 1-2 weeks     Any questions please call: [phone]     Please fax these results to [phone].    .

## 2018-03-19 NOTE — TELEPHONE ENCOUNTER
Call placed to patient: Patient confirms current dose and accurate twelve hour level. Patient verbalize understanding to recheck level on 3-. Order placed

## 2018-04-02 DIAGNOSIS — Z94.0 KIDNEY TRANSPLANTED: Primary | ICD-10-CM

## 2018-04-02 DIAGNOSIS — Z94.0 KIDNEY REPLACED BY TRANSPLANT: ICD-10-CM

## 2018-04-02 RX ORDER — TACROLIMUS 0.5 MG/1
0.5 CAPSULE ORAL 2 TIMES DAILY
Qty: 60 CAPSULE | Refills: 11 | Status: SHIPPED | OUTPATIENT
Start: 2018-04-02 | End: 2019-03-01

## 2018-04-02 NOTE — TELEPHONE ENCOUNTER
Drug Name: tacrolimus 0.5mg  Last Fill Date: 2/22/18  Quantity: 60    Shellie Hopper   [facility]  [phone]

## 2018-05-03 DIAGNOSIS — Z94.0 KIDNEY REPLACED BY TRANSPLANT: ICD-10-CM

## 2018-05-03 DIAGNOSIS — Z94.0 KIDNEY TRANSPLANTED: ICD-10-CM

## 2018-05-03 RX ORDER — PREDNISONE 2.5 MG/1
2.5 TABLET ORAL DAILY
Qty: 30 TABLET | Refills: 11 | Status: SHIPPED | OUTPATIENT
Start: 2018-05-03 | End: 2019-03-01

## 2018-05-03 NOTE — TELEPHONE ENCOUNTER
Drug Name: prednisone 2.5mg  Last Fill Date: 1/25/18  Quantity: 30    Shellie Hopper   [facility]  [phone]

## 2018-06-05 ENCOUNTER — TELEPHONE (OUTPATIENT)
Dept: TRANSPLANT | Facility: CLINIC | Age: 58
End: 2018-06-05

## 2018-06-05 NOTE — TELEPHONE ENCOUNTER
Provider Call: General  Route to LPN    Reason for call: Patient is having Surgery in 7/13/18 and Vale needs to know if his medications should be adjusted or stop?    Call back needed? Yes    Return Call Needed  Same as documented in contacts section  When to return call?: Greater than one day: Route standard priority

## 2018-07-09 ENCOUNTER — TELEPHONE (OUTPATIENT)
Dept: TRANSPLANT | Facility: CLINIC | Age: 58
End: 2018-07-09

## 2018-07-09 NOTE — TELEPHONE ENCOUNTER
Patient Call: Transplant Lab/Orders  At lab right now;  [facility] lab Fax# [phone]  Route to LPN  Post Transplant Days: 10006  When patient is less than 60 days post-transplant, route high priority    Reason for Call: Annual lab reorder   Callback needed? No

## 2018-07-09 NOTE — LETTER
[facility]  Room 2-200  [facility],  [address]  [address]  Tel [phone]  Toll Free [phone]                OUTPATIENT LABORATORY TEST ORDER    Patient Name: Lorne Juan  Transplant Date: 2/15/1991 (Kidney) YOB: 1960  Issue Date & Time:July 9, 20188:36 AM    [facility] MR:  [id number]  Exp. Date (1 year after date issued)      Diagnoses: Kidney Transplant (ICD-10  Z94.0)   Long term use of medications (ICD-10  Z79.899)     Lab results to be available on the same day drawn.   Patient should release information to the [facility], [facility].  Please fax to the Transplant Center at [phone].    Every 3 months  ?Hemogram and Platelet  ?Basic Metabolic Panel (Sodium, Potassium, Chloride, CO2, Creatinine, Urea Nitrogen, Glucose, Calcium)  ?/Tacrolimus/Prograf drug level    Every 6 Months                  ?Urine for protein/creatinine    If you have any questions, please call [facility] at [phone] or [phone].    Please fax labs to [phone]  .

## 2018-08-27 DIAGNOSIS — Z94.0 KIDNEY REPLACED BY TRANSPLANT: ICD-10-CM

## 2018-08-27 RX ORDER — MYCOPHENOLATE MOFETIL 500 MG/1
TABLET ORAL
Qty: 120 TABLET | Refills: 5 | Status: SHIPPED | OUTPATIENT
Start: 2018-08-27 | End: 2019-03-01

## 2018-12-12 ENCOUNTER — TELEPHONE (OUTPATIENT)
Dept: TRANSPLANT | Facility: CLINIC | Age: 58
End: 2018-12-12

## 2018-12-12 NOTE — TELEPHONE ENCOUNTER
"Called and spoke with Lorne about potentially starting the kidney transplant evaluation process as he now has a qualifying GFR.  Patient reports that he has moved in the last year and he is close to [place].  In July, he went through the kidney evaluation process at [place] and was turned down and does not want to pursue it at this time.  He does follow with a local nephrologist and has an AV fistuala.  He states he is \"planning to ride this kidney out as long as he can.\"  I let him know if he changes his mind he can always contact us to start the process.  Pt. verbalized understanding    Arnoldo Zaman MD Hasebroock, Rebecca, RN             yes    Previous Messages      ----- Message -----   From: Mirlande Hernandez, RN   Sent: 12/11/2018   2:53 PM   To: Arnoldo Zaman MD   Subject: Kidney referral                                   Yasmin Zaman-   You saw Lorne in February and in your clinic note you mentioned that once he hit a qualifying GFR you would recommend a kidney transplant reevaluation.     His most recent labs are in and his GFR is 17.  Is it ok if I go ahead and place the kidney transplant referral to start the process?     [place]           "

## 2018-12-27 ENCOUNTER — DOCUMENTATION ONLY (OUTPATIENT)
Dept: TRANSPLANT | Facility: CLINIC | Age: 58
End: 2018-12-27

## 2018-12-27 ENCOUNTER — TELEPHONE (OUTPATIENT)
Dept: TRANSPLANT | Facility: CLINIC | Age: 58
End: 2018-12-27

## 2018-12-27 DIAGNOSIS — Z48.298 AFTERCARE FOLLOWING ORGAN TRANSPLANT: ICD-10-CM

## 2018-12-27 DIAGNOSIS — Z94.0 KIDNEY TRANSPLANTED: Primary | ICD-10-CM

## 2018-12-27 NOTE — TELEPHONE ENCOUNTER
CALL BACK  Patient returned call.     Hospitalized at [facility] in [facility] from 12/24-12/26/2018. Treat with diuretics and IV fluids his nephrologist, Sallie Yanez MD. Has follow up labs and clinic appointment scheduled. Wishes to keep CKD management with [facility], but wants to continue immunosuppression management through [facility].

## 2018-12-27 NOTE — TELEPHONE ENCOUNTER
Creatinine 5.02, up from baseline 3.2-3.6.  BUN 86, up from baseline 45-65.   e-GFR 17, K+ 4.6, CO2 17  27 years post kidney transplant  Marked increase in BUN suggests dehydration    PLAN:  Call patient    Was diuretic dose increase?   Diarrhea, nausea, vomiting?   Is his local nephrologist aware of lab results?   Ensure he has nephrology care    PHONE CALL:  Called patient with questions per plan. Left voicemail message requesting call back to discuss lab results.

## 2018-12-28 NOTE — PROGRESS NOTES
Chart Prep    Clinic Visit on:  2/25/19    Last lab completed:  12/24/18    Lab letter updated:  7/9/18    Lab orders up to date in Epic.

## 2019-01-03 ENCOUNTER — TELEPHONE (OUTPATIENT)
Dept: NEPHROLOGY | Facility: CLINIC | Age: 59
End: 2019-01-03

## 2019-01-03 NOTE — TELEPHONE ENCOUNTER
Spoke with patient for transplant follow up. States he's had an up and down year, follows closely with his local nephrologist. Denied questions or concerns for us ahead of his appointment on 2/25.    Wendy Padilla RN

## 2019-02-19 ENCOUNTER — TELEPHONE (OUTPATIENT)
Dept: TRANSPLANT | Facility: CLINIC | Age: 59
End: 2019-02-19

## 2019-02-19 NOTE — TELEPHONE ENCOUNTER
Received a call from Dr. Sallie Yanez nephrologist in [place].    She is updating me that Lorne started dialysis a few weeks ago.  He was evaluated and denied for retransplantion at [place].  She reports the patient does not have interest in retransplantation.  She has questions of what should the plan be for his immunosuppression taper.  He is currently on prednisone 2.5 mg daily, mycophenolate 500 mg BID, tacrolimus 0.5 mg twice daily.    I explained I will review the plan with our transplant team and get back to her.  She requests I call [phone] and have her paged with recommendations.

## 2019-02-20 NOTE — TELEPHONE ENCOUNTER
Provider Call: General      Reason for call: Elisabeth From Dr. Yanez office left a VM message on 2/20/19 at 2:40 pm stated that they are not seeing the patient and if you call [phone] and ask for care manager's María or Elaine they would be able to update you on the patient's output. Patient started dialysis on 2/1/19     Call back needed? If needed

## 2019-02-20 NOTE — TELEPHONE ENCOUNTER
Called Dr. Yanez's office and left a v/m for her RN to return call to sot office.    Wondering if patient is making urine while on dialysis, if so how much per day as this will affect the IS taper plan.  Also requesting the date he was started on dialysis.

## 2019-02-21 NOTE — TELEPHONE ENCOUNTER
Called and left v/m at [facility] dialysis unit to return call to sot office to discuss urine output.

## 2019-02-26 NOTE — TELEPHONE ENCOUNTER
Received a call from dialysis RN.  Patient reports his typical urine output is 300-700 mLs/day.   Will discuss with MD today and come up with an IS taper plan.   Date dialysis started was 2/1/19.  Message sent to graft failure pool.

## 2019-02-27 NOTE — TELEPHONE ENCOUNTER
Reviewed patient with Dr. Rodriguez who recommended weaning IS over 9 months, plan listed below.  Called and left a v/m for Dr. Sallie Yanez's RN with IS weaning plan.  Patient is currently taking tacrolimus 0.5 mg BID, prednisone 2.5 mg daily, and mycophenolate 500 mg BID.    Plan to decrease tacrolimus to 0.5 mg daily and 2.5 mg prednisone every other day for 3 months and then stop.    After 3 months, decrease mycophenolate to 250 mg BID for 3 months, and then 250 mg daily for 3 months, and then stop.

## 2019-02-28 ENCOUNTER — TELEPHONE (OUTPATIENT)
Dept: TRANSPLANT | Facility: CLINIC | Age: 59
End: 2019-02-28

## 2019-02-28 DIAGNOSIS — Z94.0 KIDNEY TRANSPLANTED: ICD-10-CM

## 2019-02-28 DIAGNOSIS — Z94.0 KIDNEY REPLACED BY TRANSPLANT: ICD-10-CM

## 2019-02-28 NOTE — TELEPHONE ENCOUNTER
Called and left a detailed voicemail for Dr. Yanez's RN, Elisabeth with IS wean plan listed below.  Asked for a return call with questions.     Plan to decrease tacrolimus to 0.5 mg daily and 2.5 mg prednisone every other day for 3 months and then stop.    After 3 months, decrease mycophenolate to 250 mg BID for 3 months, and then 250 mg daily for 3 months, and then stop.

## 2019-02-28 NOTE — TELEPHONE ENCOUNTER
Provider Call: General  Route to LPN    Reason for call: Follow up on plan regarding immunosuppressions.     Call back needed? Yes    Return Call Needed  Same as documented in contacts section  When to return call?: Same day: Route High Priority

## 2019-03-01 ENCOUNTER — TELEPHONE (OUTPATIENT)
Dept: TRANSPLANT | Facility: CLINIC | Age: 59
End: 2019-03-01

## 2019-03-01 DIAGNOSIS — Z94.0 KIDNEY REPLACED BY TRANSPLANT: ICD-10-CM

## 2019-03-01 DIAGNOSIS — Z94.0 KIDNEY TRANSPLANTED: ICD-10-CM

## 2019-03-01 RX ORDER — TACROLIMUS 0.5 MG/1
0.5 CAPSULE ORAL DAILY
Qty: 90 CAPSULE | Refills: 0 | Status: SHIPPED | OUTPATIENT
Start: 2019-03-01 | End: 2019-03-01

## 2019-03-01 RX ORDER — PREDNISONE 2.5 MG/1
2.5 TABLET ORAL EVERY OTHER DAY
Qty: 45 TABLET | Refills: 0 | Status: SHIPPED | OUTPATIENT
Start: 2019-03-01 | End: 2019-03-01

## 2019-03-01 RX ORDER — MYCOPHENOLATE MOFETIL 500 MG/1
500 TABLET ORAL 2 TIMES DAILY
Qty: 180 TABLET | Refills: 0 | Status: SHIPPED | OUTPATIENT
Start: 2019-03-01 | End: 2019-03-01

## 2019-03-01 RX ORDER — TACROLIMUS 0.5 MG/1
0.5 CAPSULE ORAL DAILY
Qty: 30 CAPSULE | Refills: 2 | Status: SHIPPED | OUTPATIENT
Start: 2019-03-01

## 2019-03-01 RX ORDER — MYCOPHENOLATE MOFETIL 500 MG/1
500 TABLET ORAL 2 TIMES DAILY
Qty: 60 TABLET | Refills: 2 | Status: SHIPPED | OUTPATIENT
Start: 2019-03-01 | End: 2019-05-31

## 2019-03-01 RX ORDER — PREDNISONE 2.5 MG/1
2.5 TABLET ORAL EVERY OTHER DAY
Qty: 15 TABLET | Refills: 2 | Status: SHIPPED | OUTPATIENT
Start: 2019-03-01

## 2019-03-01 NOTE — TELEPHONE ENCOUNTER
Medication/Refill approved per CPA:    [facility] [place] [facility] COLLABORATIVE AGREEMENT FOR  IMMUNOSUPPRESSENT PRESCRIPTION MODIFICATION.      Routing encounter to Transplant as an FYI.    Thanks,  Mike Engel [facility]  Specialty Pharmacist [phone]

## 2019-03-01 NOTE — TELEPHONE ENCOUNTER
Spoke with Lorne and reviewed the IS wean plan.   Patient verbalized understanding.  Aware that Dr. Yanez also has the wean plan and he can reach out to us or her with any questions.   Rx sent.

## 2019-03-01 NOTE — TELEPHONE ENCOUNTER
Medication/Refill approved per CPA:    [facility] [place] [facility] COLLABORATIVE AGREEMENT FOR  IMMUNOSUPPRESSENT PRESCRIPTION MODIFICATION.      Routing encounter to Transplant as an FYI.    Thanks,  Mike Engel [facility]  Specialty Pharmacist [phone]

## 2019-03-01 NOTE — TELEPHONE ENCOUNTER
Medication/Refill approved per CPA:    [facility] [place] [facility] COLLABORATIVE AGREEMENT FOR  IMMUNOSUPPRESSENT PRESCRIPTION MODIFICATION.      Routing encounter to Transplant as an FYI.    Thanks,  Mike Engel [facility]  Specialty Pharmacist [phone]

## 2019-03-01 NOTE — TELEPHONE ENCOUNTER
Called and spoke with Lorne to discuss the plan to wean his IS.    Patient was driving and did not have good phone signal.  He will call back later.     IS wean over 9 months:  Plan to decrease tacrolimus to 0.5 mg daily and 2.5 mg prednisone every other day for 3 months and then stop.    After 3 months, decrease mycophenolate to 250 mg BID for 3 months, and then 250 mg daily for 3 months, and then stop.

## 2019-05-31 DIAGNOSIS — Z94.0 KIDNEY REPLACED BY TRANSPLANT: Primary | ICD-10-CM

## 2019-05-31 RX ORDER — MYCOPHENOLATE MOFETIL 500 MG/1
500 TABLET ORAL 2 TIMES DAILY
Qty: 60 TABLET | Refills: 0 | Status: SHIPPED | OUTPATIENT
Start: 2019-05-31 | End: 2019-06-11

## 2019-06-11 DIAGNOSIS — Z94.0 KIDNEY REPLACED BY TRANSPLANT: Primary | ICD-10-CM

## 2019-06-11 RX ORDER — MYCOPHENOLATE MOFETIL 250 MG/1
CAPSULE ORAL
Qty: 270 CAPSULE | Refills: 0 | Status: SHIPPED | OUTPATIENT
Start: 2019-06-11 | End: 2019-07-03

## 2019-06-11 NOTE — TELEPHONE ENCOUNTER
Patient Call: Medication Refill  Route to LPN  Instruct the patient to first contact their pharmacy. If they have called their pharmacy and require further assistance, route to LPN.    Pharmacy Name: [facility]  Pharmacy Location: [place]  Name of Medication: Mycophenolate 250 mg   When will the patient be out of this medication?: Less than 24 hours [facility] then [facility] if no answer)  Patient is winging off Tx medication and is to start taking 250 mg today.

## 2019-07-03 DIAGNOSIS — Z94.0 KIDNEY REPLACED BY TRANSPLANT: Primary | ICD-10-CM

## 2019-07-04 RX ORDER — MYCOPHENOLATE MOFETIL 250 MG/1
250 CAPSULE ORAL 2 TIMES DAILY
Qty: 60 CAPSULE | Refills: 0 | Status: SHIPPED | OUTPATIENT
Start: 2019-07-04 | End: 2019-08-01

## 2019-08-01 DIAGNOSIS — Z94.0 KIDNEY REPLACED BY TRANSPLANT: Primary | ICD-10-CM

## 2019-08-02 RX ORDER — MYCOPHENOLATE MOFETIL 250 MG/1
250 CAPSULE ORAL 2 TIMES DAILY
Qty: 60 CAPSULE | Refills: 0 | Status: SHIPPED | OUTPATIENT
Start: 2019-08-02 | End: 2019-08-29

## 2019-08-29 ENCOUNTER — TELEPHONE (OUTPATIENT)
Dept: TRANSPLANT | Facility: CLINIC | Age: 59
End: 2019-08-29

## 2019-08-29 DIAGNOSIS — Z94.0 KIDNEY REPLACED BY TRANSPLANT: Primary | ICD-10-CM

## 2019-08-29 RX ORDER — MYCOPHENOLATE MOFETIL 250 MG/1
250 CAPSULE ORAL DAILY
Qty: 30 CAPSULE | Refills: 3 | Status: SHIPPED | OUTPATIENT
Start: 2019-08-29 | End: 2019-10-30

## 2019-08-29 NOTE — TELEPHONE ENCOUNTER
Patient on dialysis.  IS wean started 3/1/19.  See plan listed below.     IS wean over 9 months:  Plan to decrease tacrolimus to 0.5 mg daily and 2.5 mg prednisone every other day for 3 months and then stop.    After 3 months, decrease mycophenolate to 250 mg BID for 3 months, and then 250 mg daily for 3 months, and then stop.    Rx requested for mycophenolate.  Rx 250 mg daily sent, 3 month supply given.  When it runs out patient is to stop mycophenolate.    Spoke with Lorne.  He is aware to decrease his mycophenolate dose to 250 mg daily for 3 months.  After that he can go ahead and stop.

## 2019-10-07 ENCOUNTER — TELEPHONE (OUTPATIENT)
Dept: TRANSPLANT | Facility: CLINIC | Age: 59
End: 2019-10-07

## 2019-10-07 DIAGNOSIS — Z94.0 KIDNEY REPLACED BY TRANSPLANT: ICD-10-CM

## 2019-10-07 NOTE — TELEPHONE ENCOUNTER
Patient Call: General  Route to LPN    Reason for call: pt wants to know if he'll have anymore refills of the mycophenolate (GENERIC EQUIVALENT) 250 MG capsule pt is on his 2nd maybe 3rd refill an wants to know if after this refill if he'll no longer need to take the Mycophenolate.  Please connect with pt for clarfication  Call back needed? Yes    Return Call Needed  Same as documented in contacts section  When to return call?: Greater than one day: Route standard priority

## 2019-10-07 NOTE — TELEPHONE ENCOUNTER
Spoke with Lorne.  Reviewed that his IS wean will go through the end of November.  He should continue taking mycophenolate 250 mg daily until then and then he can stop.  Lorne verbalized understanding.

## 2019-10-30 RX ORDER — MYCOPHENOLATE MOFETIL 250 MG/1
250 CAPSULE ORAL DAILY
Qty: 30 CAPSULE | Refills: 0 | Status: SHIPPED | OUTPATIENT
Start: 2019-10-30

## 2019-10-30 NOTE — TELEPHONE ENCOUNTER
Patient Call: [facility] Questions/Refill      Reason for call: Pt is almost done with his Mycophenolate medication for this month. Is the pt suppose to continue for a final refill or stop taking after he runs out.    If he is to continue,  send refill to  [facility].    Please connect with the pt to advise    Call back needed? Yes    Return Call Needed  Same as documented in contacts section  When to return call?: Same day: Route High Priority

## 2019-10-30 NOTE — TELEPHONE ENCOUNTER
Returned call to Lrone.  Explained he is to continue 1 more month of mycophenolate 250 mg daily.  At the end of November, he can stop.  1 month refill sent to  specialty pharmacy

## 2020-02-27 NOTE — PROGRESS NOTES
Patient called for an update, said he hasn't heard from the clinic. Informed I have not heard a response. Sent another message to transplant coordinator. Patient aware.    Wendy Padilla RN   Warm